# Patient Record
Sex: FEMALE | Race: WHITE | Employment: OTHER | URBAN - METROPOLITAN AREA
[De-identification: names, ages, dates, MRNs, and addresses within clinical notes are randomized per-mention and may not be internally consistent; named-entity substitution may affect disease eponyms.]

---

## 2017-05-25 ENCOUNTER — OFFICE VISIT (OUTPATIENT)
Dept: FAMILY MEDICINE | Facility: CLINIC | Age: 67
End: 2017-05-25
Payer: COMMERCIAL

## 2017-05-25 VITALS
BODY MASS INDEX: 33.08 KG/M2 | HEIGHT: 63 IN | HEART RATE: 55 BPM | TEMPERATURE: 97.2 F | DIASTOLIC BLOOD PRESSURE: 75 MMHG | SYSTOLIC BLOOD PRESSURE: 137 MMHG | WEIGHT: 186.7 LBS

## 2017-05-25 DIAGNOSIS — R30.0 DYSURIA: Primary | ICD-10-CM

## 2017-05-25 DIAGNOSIS — N30.00 ACUTE CYSTITIS WITHOUT HEMATURIA: ICD-10-CM

## 2017-05-25 LAB
ALBUMIN UR-MCNC: NEGATIVE MG/DL
APPEARANCE UR: CLEAR
BILIRUB UR QL STRIP: NEGATIVE
COLOR UR AUTO: YELLOW
GLUCOSE UR STRIP-MCNC: NEGATIVE MG/DL
HGB UR QL STRIP: NEGATIVE
KETONES UR STRIP-MCNC: NEGATIVE MG/DL
LEUKOCYTE ESTERASE UR QL STRIP: ABNORMAL
NITRATE UR QL: NEGATIVE
NON-SQ EPI CELLS #/AREA URNS LPF: ABNORMAL /LPF
PH UR STRIP: 6 PH (ref 5–7)
RBC #/AREA URNS AUTO: ABNORMAL /HPF (ref 0–2)
SP GR UR STRIP: 1.01 (ref 1–1.03)
URN SPEC COLLECT METH UR: ABNORMAL
UROBILINOGEN UR STRIP-ACNC: 0.2 EU/DL (ref 0.2–1)
WBC #/AREA URNS AUTO: ABNORMAL /HPF (ref 0–2)

## 2017-05-25 PROCEDURE — 81001 URINALYSIS AUTO W/SCOPE: CPT | Performed by: NURSE PRACTITIONER

## 2017-05-25 PROCEDURE — 99213 OFFICE O/P EST LOW 20 MIN: CPT | Performed by: NURSE PRACTITIONER

## 2017-05-25 RX ORDER — AMOXICILLIN 500 MG/1
500 CAPSULE ORAL 3 TIMES DAILY
Qty: 21 CAPSULE | Refills: 0 | Status: SHIPPED | OUTPATIENT
Start: 2017-05-25 | End: 2017-06-01

## 2017-05-25 NOTE — MR AVS SNAPSHOT
"              After Visit Summary   5/25/2017    Precious Reyes    MRN: [id number]           Patient Information     Date Of Birth          1950        Visit Information        Provider Department      5/25/2017 10:00 AM Anna Snow APRN CNP [facility]        Today's Diagnoses     Dysuria    -  1    Acute cystitis without hematuria          Care Instructions    Drink enough fluids  Amoxicillin 1 tablet 3 times daily for 1 week    For back pain try heat, Aspercreme patch, or cream  Tylenol 500 mg 4 times daily as needed           Follow-ups after your visit        Who to contact     If you have questions or need follow up information about today's clinic visit or your schedule please contact [facility] directly at [phone].  Normal or non-critical lab and imaging results will be communicated to you by PolySuitehart, letter or phone within 4 business days after the clinic has received the results. If you do not hear from us within 7 days, please contact the clinic through PolySuitehart or phone. If you have a critical or abnormal lab result, we will notify you by phone as soon as possible.  Submit refill requests through Whimseybox or call your pharmacy and they will forward the refill request to us. Please allow 3 business days for your refill to be completed.          Additional Information About Your Visit        MyChart Information     Whimseybox lets you send messages to your doctor, view your test results, renew your prescriptions, schedule appointments and more. To sign up, go to [email or web address] . Click on \"Log in\" on the left side of the screen, which will take you to the Welcome page. Then click on \"Sign up Now\" on the right side of the page.     You will be asked to enter the access code listed below, as well as some personal information. Please follow the directions to create your username and password.     Your access code is: [email or web address]  Expires: 8/23/2017 10:43 AM   " "  Your access code will  in 90 days. If you need help or a new code, please call your [facility] clinic or [phone].        Care EveryWhere ID     This is your Care EveryWhere ID. This could be used by other organizations to access your [facility] medical records  [id number]        Your Vitals Were     Pulse Temperature Height BMI (Body Mass Index)          55 97.2  F (36.2  C) (Tympanic) 5' 2.5\" (1.588 m) 33.6 kg/m2         Blood Pressure from Last 3 Encounters:   17 137/75   10/12/16 130/64   16 130/76    Weight from Last 3 Encounters:   17 186 lb 11.2 oz (84.7 kg)   10/12/16 185 lb (83.9 kg)   16 188 lb (85.3 kg)              We Performed the Following     *UA reflex to Microscopic and Culture ([place] and [facility] (except [place] and [place])     Urine Microscopic          Today's Medication Changes          These changes are accurate as of: 17 10:43 AM.  If you have any questions, ask your nurse or doctor.               Start taking these medicines.        Dose/Directions    amoxicillin 500 MG capsule   Commonly known as:  AMOXIL   Used for:  Acute cystitis without hematuria   Started by:  Anna Snow APRN CNP        Dose:  500 mg   Take 1 capsule (500 mg) by mouth 3 times daily for 7 days   Quantity:  21 capsule   Refills:  0            Where to get your medicines      These medications were sent to [facility] [address] [place] [address]     Phone:  [phone]     amoxicillin 500 MG capsule                Primary Care Provider Office Phone # Fax #    Chau Cox [phone]       [facility] [address] [facility] [address]        Thank you!     Thank you for choosing [facility]  for your care. Our goal is always to provide you with excellent care. Hearing back from our patients is one way we can continue to improve our services. Please " take a few minutes to complete the written survey that you may receive in the mail after your visit with us. Thank you!             Your Updated Medication List - Protect others around you: Learn how to safely use, store and throw away your medicines at www.disposemymeds.org.          This list is accurate as of: 5/25/17 10:43 AM.  Always use your most recent med list.                   Brand Name Dispense Instructions for use    amoxicillin 500 MG capsule    AMOXIL    21 capsule    Take 1 capsule (500 mg) by mouth 3 times daily for 7 days       aspirin 81 MG tablet     100    ONE DAILY       atenolol 50 MG tablet    TENORMIN    10 tablet    Take 1.5 tablets (75 mg) by mouth daily       CENTRUM SILVER per tablet      Take 1 tablet by mouth daily       hydrochlorothiazide 25 MG tablet    HYDRODIURIL    90 tablet    Take 1 tablet (25 mg) by mouth daily       ibuprofen 200 MG tablet    ADVIL/MOTRIN     TAKE 1 TO 2 TABLETS EVERY 4 TO 6 HOURS AS NEEDED WITH FOOD       loratadine 10 MG tablet    CLARITIN     Take 10 mg by mouth daily       order for DME     1    needs have cpap machine and mask, last one 10 years old, not fitting right       tolterodine 4 MG 24 hr capsule    DETROL LA    90 capsule    Take 1 capsule (4 mg) by mouth daily       VITAMIN D PO      Take 1 capsule by mouth.

## 2017-05-25 NOTE — PROGRESS NOTES
"  SUBJECTIVE:                                                    Precious Reyes is a 66 year old female who presents to clinic today for the following health issues: bilateral flank and lower back pain, urinary frequency, cloudy urine. Symptoms started 3 days ago.     URINARY TRACT SYMPTOMS     Onset: 3 days     Description:   Painful urination (Dysuria): no   Blood in urine (Hematuria): no   Delay in urine (Hesitency): YES    Intensity: moderate    Progression of Symptoms:  same    Accompanying Signs & Symptoms:  Fever/chills: no   Flank pain YES  Nausea and vomiting: no   Any vaginal symptoms: vaginal odor, urine was cloudy   Abdominal/Pelvic Pain: YES abdominal pain    History:   History of frequent UTI's: no   History of kidney stones: no   Sexually Active: YES  Possibility of pregnancy: No    Precipitating factors:   None        Therapies Tried and outcome: Azogen tablets     Problem list and histories reviewed & adjusted, as indicated.  Additional history: as documented    Labs reviewed in EPIC    Reviewed and updated as needed this visit by clinical staff  Tobacco  Allergies  Med Hx  Surg Hx  Fam Hx  Soc Hx      Reviewed and updated as needed this visit by Provider         ROS:  Constitutional, HEENT, cardiovascular, pulmonary, gi and gu systems are negative, except as otherwise noted.    OBJECTIVE:                                                    /75  Pulse 55  Temp 97.2  F (36.2  C) (Tympanic)  Ht 5' 2.5\" (1.588 m)  Wt 186 lb 11.2 oz (84.7 kg)  BMI 33.6 kg/m2  Body mass index is 33.6 kg/(m^2).  GENERAL: healthy, alert and no distress  ABDOMEN: tenderness suprapubic and right and left LQ mild tenderness, no organomegaly or masses, no rebound tenderness  MS: no gross musculoskeletal defects noted, no edema  BACK: no CVA tenderness, no paralumbar tenderness    Diagnostic Test Results:  Urinalysis - positive for mild UTI     ASSESSMENT/PLAN:                                                  "     1. Dysuria    - UA reflex to Microscopic and Culture ([place] [facility] (except [place] and [place])  - Urine Microscopic-positive for mild UTI    2. Acute cystitis without hematuria  - amoxicillin (AMOXIL) 500 MG capsule; Take 1 capsule (500 mg) by mouth 3 times daily for 7 days  Dispense: 21 capsule; Refill: 0  -Tylenol 500 mg 4 times daily as needed for back pain     See Patient Instructions    LIANA Burns [facility]

## 2017-05-25 NOTE — NURSING NOTE
"Chief Complaint   Patient presents with     Flank Pain     3 days        Initial /75  Pulse 55  Temp 97.2  F (36.2  C) (Tympanic)  Ht 5' 2.5\" (1.588 m)  Wt 186 lb 11.2 oz (84.7 kg)  BMI 33.6 kg/m2 Estimated body mass index is 33.6 kg/(m^2) as calculated from the following:    Height as of this encounter: 5' 2.5\" (1.588 m).    Weight as of this encounter: 186 lb 11.2 oz (84.7 kg).  Medication Reconciliation: complete  "

## 2017-05-25 NOTE — PATIENT INSTRUCTIONS
Drink enough fluids  Amoxicillin 1 tablet 3 times daily for 1 week    For back pain try heat, Aspercreme patch, or cream  Tylenol 500 mg 4 times daily as needed

## 2017-09-08 ENCOUNTER — OFFICE VISIT (OUTPATIENT)
Dept: URGENT CARE | Facility: URGENT CARE | Age: 67
End: 2017-09-08
Payer: COMMERCIAL

## 2017-09-08 VITALS
WEIGHT: 185.8 LBS | HEART RATE: 53 BPM | SYSTOLIC BLOOD PRESSURE: 135 MMHG | BODY MASS INDEX: 33.44 KG/M2 | TEMPERATURE: 97.8 F | DIASTOLIC BLOOD PRESSURE: 72 MMHG | RESPIRATION RATE: 20 BRPM

## 2017-09-08 DIAGNOSIS — H11.32 SUBCONJUNCTIVAL HEMORRHAGE, LEFT: Primary | ICD-10-CM

## 2017-09-08 PROCEDURE — 99213 OFFICE O/P EST LOW 20 MIN: CPT

## 2017-09-08 NOTE — MR AVS SNAPSHOT
After Visit Summary   9/8/2017    Precious Reyes    MRN: [id number]           Patient Information     Date Of Birth          1950        Visit Information        Provider Department      9/8/2017 5:05 PM [facility] SAME DAY PROVIDER [facility]        Today's Diagnoses     Subconjunctival hemorrhage, left    -  1      Care Instructions    This is a ruptured blood vessel in your eye, probably from your sneezing yesterday. This will absorb over time.    If you would have any pain or any change in vision make sure you go to the ER or your eye doctor.      Indications for emergent return to emergency department discussed with patient, who verbalized good understanding and agreement.  Patient understands the limitations of today's evaluation.         Subconjunctival Hemorrhage    A subconjunctival hemorrhage is when a blood vessel breaks open in the white of the eye. It causes a bright red patch in the white of the eye. It is similar to a bruise on the skin. This type of hemorrhage is common. It can look quite alarming, but it is usually harmless.  Understanding the conjunctiva  The conjunctiva is the thin layer that covers the inside of the eyelids and the surface of the eye. It has many tiny blood vessels that bring oxygen and nutrients to the eye. The sclera is the white part of the eye that lies beneath the conjunctiva. Sometimes a blood vessel in the conjunctiva breaks open and bleeds. The blood then collects under the conjunctiva and turns part of the eye red. Over several weeks, your body then absorbs the blood.  What causes subconjunctival hemorrhage?  In many cases the cause isn t known. But some health conditions may make it more likely. These include:    Eye injury    Eye surgery    High blood pressure    Inflammation of the conjunctiva    Contact lens use    Diabetes    Arteriosclerosis    Tumor of the conjunctiva    Diseases that affect blood clotting    Violent  sneezing, coughing, or vomiting    Certain medicines that can increase bleeding, such as aspirin    Pushing hard during childbirth    Straining during constipation  Symptoms of subconjunctival hemorrhage  The main symptom is a red patch on the eye. You may notice it after waking up in the morning. In most cases just one eye will have a hemorrhage. It usually happens once and then goes away. But some health conditions may cause repeat hemorrhages. You may feel like you have something in your eye, but this is not common. The hemorrhage shouldn t affect your eyesight or cause any pain. If you do have pain, you may have another type of problem with your eye.  Diagnosing subconjunctival hemorrhage  Your healthcare provider will ask about your health history. You may have a physical exam. This includes a basic eye exam. Your provider will make sure you don t have other causes of red eye that may need other treatment.  You will need to see an eye doctor (ophthalmologist) if you have had an eye injury. This doctor might use a special lighted microscope to look closely at your eye. This helps show the doctor if the injury hurt the eye itself and not just its outer layer.  If this is not your first subconjunctival hemorrhage, your doctor may need to find the cause. For example, you may need blood tests to check for a blood clotting disorder.  Treatment for subconjunctival hemorrhage  In most cases you will not need treatment. The red patch will usually go away on its own in a few weeks. It will turn from red to brown and then yellow. There are no treatments to speed up this process. Your doctor may suggest you use a warm compress and artificial tears eye drops to help relieve some of the redness.  If your subconjunctival hemorrhage was caused by a health condition, that condition will be treated. For example, you may need a blood pressure medicine to treat high blood pressure.  When to call your healthcare provider  Call your  healthcare provider right away if you have any of these:    Hemorrhage that doesn t go away in 2 to 3 weeks    Eye pain    Loss of eyesight    Another subconjunctival hemorrhage    Date Last Reviewed: 2/1/2017 2000-2017 [employer]. [address] [place] [address]. All rights reserved. This information is not intended as a substitute for professional medical care. Always follow your healthcare professional's instructions.        Subconjunctival Hemorrhage    A subconjunctival hemorrhage is a result of a broken blood vessel in the white portion of the eye. It is usually painless and may be caused by coughing, sneezing, or vomiting. An injury to the eye can cause this. It can also be a sign of hypertension (high blood pressure) or a bleeding disorder.  Although it can look frightening, the presence of the blood is not serious. The blood will be reabsorbed without treatment within 2 to 3 weeks.  Home care  You may continue your usual activities.  Follow-up care  Follow up with your healthcare provider, or as advised.  When to seek medical advice  Contact your healthcare provider right away if any of these occur:    Pain in the eye    Change in vision    The blood does not disappear within three weeks    Increasing redness or swelling of the eye    Severe headache or dizziness    Signs of bruising or bleeding from other parts of your body  Date Last Reviewed: 6/14/2015 2000-2017 [employer]. [address] [place] [address]. All rights reserved. This information is not intended as a substitute for professional medical care. Always follow your healthcare professional's instructions.                Follow-ups after your visit        Follow-up notes from your care team     See patient instructions section of the AVS Return if symptoms worsen or fail to improve.      Who to contact     If you have questions or need follow up information about today's clinic visit or your  "schedule please contact [facility] directly at [phone].  Normal or non-critical lab and imaging results will be communicated to you by MyChart, letter or phone within 4 business days after the clinic has received the results. If you do not hear from us within 7 days, please contact the clinic through Compositencehart or phone. If you have a critical or abnormal lab result, we will notify you by phone as soon as possible.  Submit refill requests through ki work or call your pharmacy and they will forward the refill request to us. Please allow 3 business days for your refill to be completed.          Additional Information About Your Visit        Compositenceharhovelstay Information     ki work lets you send messages to your doctor, view your test results, renew your prescriptions, schedule appointments and more. To sign up, go to [email or web address] . Click on \"Log in\" on the left side of the screen, which will take you to the Welcome page. Then click on \"Sign up Now\" on the right side of the page.     You will be asked to enter the access code listed below, as well as some personal information. Please follow the directions to create your username and password.     Your access code is: [email or web address]  Expires: 2017  5:32 PM     Your access code will  in 90 days. If you need help or a new code, please call your [facility] clinic or [phone].        Care EveryWhere ID     This is your Care EveryWhere ID. This could be used by other organizations to access your [facility] medical records  [id number]        Your Vitals Were     Pulse Temperature Respirations BMI (Body Mass Index)          53 97.8  F (36.6  C) (Tympanic) 20 33.44 kg/m2         Blood Pressure from Last 3 Encounters:   17 135/72   17 137/75   10/12/16 130/64    Weight from Last 3 Encounters:   17 185 lb 12.8 oz (84.3 kg)   17 186 lb 11.2 oz (84.7 kg)   10/12/16 185 lb (83.9 kg)              Today, you had " the following     No orders found for display       Primary Care Provider Office Phone # Fax #    Chau Cox [phone] [address] [facility] [address]        Equal Access to Services     ISAAC LIM : Hadii aad ku hadluis angelo Sodeionali, waaxda luqadaha, qaybta kaalmada adeegyada, raymond marie antoinette jaramillo. So [facility] [phone].    ATENCIÓN: Si habla español, tiene a farooq disposición servicios gratuitos de asistencia lingüística. Llame al [phone].    We comply with applicable federal civil rights laws and [place] laws. We do not discriminate on the basis of race, color, national origin, age, disability sex, sexual orientation or gender identity.            Thank you!     Thank you for choosing [facility]  for your care. Our goal is always to provide you with excellent care. Hearing back from our patients is one way we can continue to improve our services. Please take a few minutes to complete the written survey that you may receive in the mail after your visit with us. Thank you!             Your Updated Medication List - Protect others around you: Learn how to safely use, store and throw away your medicines at www.disposemymeds.org.          This list is accurate as of: 9/8/17  5:32 PM.  Always use your most recent med list.                   Brand Name Dispense Instructions for use Diagnosis    aspirin 81 MG tablet     100    ONE DAILY        atenolol 50 MG tablet    TENORMIN    10 tablet    Take 1.5 tablets (75 mg) by mouth daily    Essential hypertension with goal blood pressure less than 140/90       CENTRUM SILVER per tablet      Take 1 tablet by mouth daily        hydrochlorothiazide 25 MG tablet    HYDRODIURIL    90 tablet    Take 1 tablet (25 mg) by mouth daily    Essential hypertension with goal blood pressure less than 140/90       ibuprofen 200 MG tablet    ADVIL/MOTRIN     TAKE 1 TO 2 TABLETS EVERY 4 TO 6 HOURS AS  NEEDED WITH FOOD        loratadine 10 MG tablet    CLARITIN     Take 10 mg by mouth daily        order for DME     1    needs have cpap machine and mask, last one 10 years old, not fitting right    LOLY (obstructive sleep apnea)       tolterodine 4 MG 24 hr capsule    DETROL LA    90 capsule    Take 1 capsule (4 mg) by mouth daily    Urgency of urination, Urge incontinence       VITAMIN D PO      Take 1 capsule by mouth.

## 2017-09-08 NOTE — PATIENT INSTRUCTIONS
This is a ruptured blood vessel in your eye, probably from your sneezing yesterday. This will absorb over time.    If you would have any pain or any change in vision make sure you go to the ER or your eye doctor.      Indications for emergent return to emergency department discussed with patient, who verbalized good understanding and agreement.  Patient understands the limitations of today's evaluation.         Subconjunctival Hemorrhage    A subconjunctival hemorrhage is when a blood vessel breaks open in the white of the eye. It causes a bright red patch in the white of the eye. It is similar to a bruise on the skin. This type of hemorrhage is common. It can look quite alarming, but it is usually harmless.  Understanding the conjunctiva  The conjunctiva is the thin layer that covers the inside of the eyelids and the surface of the eye. It has many tiny blood vessels that bring oxygen and nutrients to the eye. The sclera is the white part of the eye that lies beneath the conjunctiva. Sometimes a blood vessel in the conjunctiva breaks open and bleeds. The blood then collects under the conjunctiva and turns part of the eye red. Over several weeks, your body then absorbs the blood.  What causes subconjunctival hemorrhage?  In many cases the cause isn t known. But some health conditions may make it more likely. These include:    Eye injury    Eye surgery    High blood pressure    Inflammation of the conjunctiva    Contact lens use    Diabetes    Arteriosclerosis    Tumor of the conjunctiva    Diseases that affect blood clotting    Violent sneezing, coughing, or vomiting    Certain medicines that can increase bleeding, such as aspirin    Pushing hard during childbirth    Straining during constipation  Symptoms of subconjunctival hemorrhage  The main symptom is a red patch on the eye. You may notice it after waking up in the morning. In most cases just one eye will have a hemorrhage. It usually happens once and then goes  away. But some health conditions may cause repeat hemorrhages. You may feel like you have something in your eye, but this is not common. The hemorrhage shouldn t affect your eyesight or cause any pain. If you do have pain, you may have another type of problem with your eye.  Diagnosing subconjunctival hemorrhage  Your healthcare provider will ask about your health history. You may have a physical exam. This includes a basic eye exam. Your provider will make sure you don t have other causes of red eye that may need other treatment.  You will need to see an eye doctor (ophthalmologist) if you have had an eye injury. This doctor might use a special lighted microscope to look closely at your eye. This helps show the doctor if the injury hurt the eye itself and not just its outer layer.  If this is not your first subconjunctival hemorrhage, your doctor may need to find the cause. For example, you may need blood tests to check for a blood clotting disorder.  Treatment for subconjunctival hemorrhage  In most cases you will not need treatment. The red patch will usually go away on its own in a few weeks. It will turn from red to brown and then yellow. There are no treatments to speed up this process. Your doctor may suggest you use a warm compress and artificial tears eye drops to help relieve some of the redness.  If your subconjunctival hemorrhage was caused by a health condition, that condition will be treated. For example, you may need a blood pressure medicine to treat high blood pressure.  When to call your healthcare provider  Call your healthcare provider right away if you have any of these:    Hemorrhage that doesn t go away in 2 to 3 weeks    Eye pain    Loss of eyesight    Another subconjunctival hemorrhage    Date Last Reviewed: 2/1/2017 2000-2017 [employer]. [address] [place] [address], [place] [address]. All rights reserved. This information is not intended as a substitute for professional medical  care. Always follow your healthcare professional's instructions.        Subconjunctival Hemorrhage    A subconjunctival hemorrhage is a result of a broken blood vessel in the white portion of the eye. It is usually painless and may be caused by coughing, sneezing, or vomiting. An injury to the eye can cause this. It can also be a sign of hypertension (high blood pressure) or a bleeding disorder.  Although it can look frightening, the presence of the blood is not serious. The blood will be reabsorbed without treatment within 2 to 3 weeks.  Home care  You may continue your usual activities.  Follow-up care  Follow up with your healthcare provider, or as advised.  When to seek medical advice  Contact your healthcare provider right away if any of these occur:    Pain in the eye    Change in vision    The blood does not disappear within three weeks    Increasing redness or swelling of the eye    Severe headache or dizziness    Signs of bruising or bleeding from other parts of your body  Date Last Reviewed: 6/14/2015 2000-2017 [employer]. [address] [place] [address], [place] [address]. All rights reserved. This information is not intended as a substitute for professional medical care. Always follow your healthcare professional's instructions.

## 2017-09-08 NOTE — PROGRESS NOTES
SUBJECTIVE:   Precious Reyes is a 67 year old female who presents to clinic today for the following health issues:    Eye(s) Problem  Onset: Yesterday morning     Description:   Location: left  Pain: no  Redness: YES    Accompanying Signs & Symptoms:  Discharge/mattering: no  Swelling: YES- slight   Visual changes: no  Fever: no  Nasal Congestion: no  Bothered by bright lights: YES- little bit     History:   Trauma: no   Foreign body exposure: no     Precipitating factors:   Wearing contacts: no    Alleviating factors:  Improved by: Na     Therapies Tried and outcome: Yesterday tried allergy visine- burned a little,  Ibuprofen today           Problem list and histories reviewed & adjusted, as indicated.  Additional history: as documented    Patient Active Problem List   Diagnosis     Other malignant neoplasm of skin, site unspecified     Essential hypertension     Urinary incontinence     Pain in joint, forearm     wrist strain bilateral     Ingrowing nail     Actinic keratosis     RIGHT KNEE PAIN AND LUMP     RECTAL PAIN     Disorder of bone and cartilage     Disease of jaw     Other chest pain     Abnormal weight gain     CARDIOVASCULAR SCREENING; LDL GOAL LESS THAN 130     Advanced directives, counseling/discussion     OA (osteoarthritis) of finger, unspecified laterality     Hypertriglyceridemia     Past Surgical History:   Procedure Laterality Date     BIOPSY BREAST       COLONOSCOPY  age 49    repeat 10 years, done at outside hospital     HC EXCISION BREAST LESION, OPEN >=1      5 biopsies all neg     HC REMOVAL GALLBLADDER  9 05    Cholecystectomy     HYSTERECTOMY, PAP NO LONGER INDICATED  2004    Hysterectomy, [facility] [place]       Social History   Substance Use Topics     Smoking status: Never Smoker     Smokeless tobacco: Never Used     Alcohol use Yes      Comment: 0-2 drinks per week     Family History   Problem Relation Age of Onset     HEART DISEASE Mother       78     Alcohol/Drug Mother       alcholism     Obesity Mother      HEART DISEASE Father      Allergies Brother      Arthritis Brother      Allergies Sister      Thyroid Disease Sister      Arthritis Sister      Lupus     Neurologic Disorder Sister      Bipolar     DIABETES Son      Allergies Sister      Thyroid Disease Sister      Thyroid Disease Sister      Thyroid Disease Sister      Cancer - colorectal No family hx of      Breast Cancer No family hx of      Endometrial Cancer No family hx of      Colon Cancer No family hx of      Ovarian Cancer No family hx of          Current Outpatient Prescriptions   Medication Sig Dispense Refill     atenolol (TENORMIN) 50 MG tablet Take 1.5 tablets (75 mg) by mouth daily 10 tablet 0     loratadine (CLARITIN) 10 MG tablet Take 10 mg by mouth daily       hydrochlorothiazide (HYDRODIURIL) 25 MG tablet Take 1 tablet (25 mg) by mouth daily 90 tablet 3     tolterodine (DETROL LA) 4 MG 24 hr capsule Take 1 capsule (4 mg) by mouth daily 90 capsule 3     Multiple Vitamins-Minerals (CENTRUM SILVER) per tablet Take 1 tablet by mouth daily       VITAMIN D PO Take 1 capsule by mouth.       IBUPROFEN 200 MG OR TABS TAKE 1 TO 2 TABLETS EVERY 4 TO 6 HOURS AS NEEDED WITH FOOD  0     ORDER FOR DME needs have cpap machine and mask, last one 10 years old, not fitting right 1 0     ASPIRIN 81 MG OR TABS ONE DAILY 100 3     Allergies   Allergen Reactions     Adhesive Tape Rash     Foam     Latex Rash     Sulfa Drugs Rash     Labs reviewed in EPIC      Reviewed and updated as needed this visit by clinical staffTobacco  Allergies  Meds  Problems  Med Hx  Surg Hx  Fam Hx  Soc Hx        Reviewed and updated as needed this visit by Provider  Allergies  Meds  Problems         ROS:  Constitutional, HEENT, cardiovascular, pulmonary, GI, , musculoskeletal, neuro, skin, endocrine and psych systems are negative, except as otherwise noted.      OBJECTIVE:   /72 (BP Location: Right arm, Cuff Size: Adult Regular)  Pulse  53  Temp 97.8  F (36.6  C) (Tympanic)  Resp 20  Wt 185 lb 12.8 oz (84.3 kg)  BMI 33.44 kg/m2  Body mass index is 33.44 kg/(m^2).   GENERAL: healthy, alert and no distress, nontoxic in appearance  EYES: Eyes grossly normal to inspection with exception of a subconjunctival hemorrhage on lateral and mid lower left eye, vision intact and no pain, PERRL bilaterally and conjunctivae normal bilaterally and sclerae normal  on right  HENT:normocephalic  NECK: supple with full ROM  ABDOMEN: soft, nontender  MS: no gross musculoskeletal defects noted, no edema    Diagnostic Test Results:  No results found for this or any previous visit (from the past 24 hour(s)).    ASSESSMENT/PLAN:     Problem List Items Addressed This Visit     None      Visit Diagnoses     Subconjunctival hemorrhage, left    -  Primary               Patient Instructions     This is a ruptured blood vessel in your eye, probably from your sneezing yesterday. This will absorb over time.    If you would have any pain or any change in vision make sure you go to the ER or your eye doctor.      Indications for emergent return to emergency department discussed with patient, who verbalized good understanding and agreement.  Patient understands the limitations of today's evaluation.         Subconjunctival Hemorrhage    A subconjunctival hemorrhage is when a blood vessel breaks open in the white of the eye. It causes a bright red patch in the white of the eye. It is similar to a bruise on the skin. This type of hemorrhage is common. It can look quite alarming, but it is usually harmless.  Understanding the conjunctiva  The conjunctiva is the thin layer that covers the inside of the eyelids and the surface of the eye. It has many tiny blood vessels that bring oxygen and nutrients to the eye. The sclera is the white part of the eye that lies beneath the conjunctiva. Sometimes a blood vessel in the conjunctiva breaks open and bleeds. The blood then collects under  the conjunctiva and turns part of the eye red. Over several weeks, your body then absorbs the blood.  What causes subconjunctival hemorrhage?  In many cases the cause isn t known. But some health conditions may make it more likely. These include:    Eye injury    Eye surgery    High blood pressure    Inflammation of the conjunctiva    Contact lens use    Diabetes    Arteriosclerosis    Tumor of the conjunctiva    Diseases that affect blood clotting    Violent sneezing, coughing, or vomiting    Certain medicines that can increase bleeding, such as aspirin    Pushing hard during childbirth    Straining during constipation  Symptoms of subconjunctival hemorrhage  The main symptom is a red patch on the eye. You may notice it after waking up in the morning. In most cases just one eye will have a hemorrhage. It usually happens once and then goes away. But some health conditions may cause repeat hemorrhages. You may feel like you have something in your eye, but this is not common. The hemorrhage shouldn t affect your eyesight or cause any pain. If you do have pain, you may have another type of problem with your eye.  Diagnosing subconjunctival hemorrhage  Your healthcare provider will ask about your health history. You may have a physical exam. This includes a basic eye exam. Your provider will make sure you don t have other causes of red eye that may need other treatment.  You will need to see an eye doctor (ophthalmologist) if you have had an eye injury. This doctor might use a special lighted microscope to look closely at your eye. This helps show the doctor if the injury hurt the eye itself and not just its outer layer.  If this is not your first subconjunctival hemorrhage, your doctor may need to find the cause. For example, you may need blood tests to check for a blood clotting disorder.  Treatment for subconjunctival hemorrhage  In most cases you will not need treatment. The red patch will usually go away on its own  in a few weeks. It will turn from red to brown and then yellow. There are no treatments to speed up this process. Your doctor may suggest you use a warm compress and artificial tears eye drops to help relieve some of the redness.  If your subconjunctival hemorrhage was caused by a health condition, that condition will be treated. For example, you may need a blood pressure medicine to treat high blood pressure.  When to call your healthcare provider  Call your healthcare provider right away if you have any of these:    Hemorrhage that doesn t go away in 2 to 3 weeks    Eye pain    Loss of eyesight    Another subconjunctival hemorrhage    Date Last Reviewed: 2/1/2017 2000-2017 [employer]. [address] [place] [address], [place] [address]. All rights reserved. This information is not intended as a substitute for professional medical care. Always follow your healthcare professional's instructions.        Subconjunctival Hemorrhage    A subconjunctival hemorrhage is a result of a broken blood vessel in the white portion of the eye. It is usually painless and may be caused by coughing, sneezing, or vomiting. An injury to the eye can cause this. It can also be a sign of hypertension (high blood pressure) or a bleeding disorder.  Although it can look frightening, the presence of the blood is not serious. The blood will be reabsorbed without treatment within 2 to 3 weeks.  Home care  You may continue your usual activities.  Follow-up care  Follow up with your healthcare provider, or as advised.  When to seek medical advice  Contact your healthcare provider right away if any of these occur:    Pain in the eye    Change in vision    The blood does not disappear within three weeks    Increasing redness or swelling of the eye    Severe headache or dizziness    Signs of bruising or bleeding from other parts of your body  Date Last Reviewed: 6/14/2015 2000-2017 [employer]. [address] [place] [address],  [place] [address]. All rights reserved. This information is not intended as a substitute for professional medical care. Always follow your healthcare professional's instructions.          FABIENNE Spaulding    FLNB SAME DAY PROVIDER  [facility]

## 2017-09-08 NOTE — NURSING NOTE
"Chief Complaint   Patient presents with     Eye Problem     lower side of eye        Initial /72 (BP Location: Right arm, Cuff Size: Adult Regular)  Pulse 53  Temp 97.8  F (36.6  C) (Tympanic)  Resp 20  Wt 185 lb 12.8 oz (84.3 kg)  BMI 33.44 kg/m2 Estimated body mass index is 33.44 kg/(m^2) as calculated from the following:    Height as of 5/25/17: 5' 2.5\" (1.588 m).    Weight as of this encounter: 185 lb 12.8 oz (84.3 kg).  Medication Reconciliation: complete    Health Maintenance that is potentially due pending provider review:  NONE    n/a    Is there anyone who you would like to be able to receive your results? Not Applicable  If yes have patient fill out ANDERS  Beena Hernandez M.A.        "

## 2017-09-12 ENCOUNTER — TELEPHONE (OUTPATIENT)
Dept: FAMILY MEDICINE | Facility: CLINIC | Age: 67
End: 2017-09-12

## 2017-09-12 ENCOUNTER — HOSPITAL ENCOUNTER (EMERGENCY)
Facility: CLINIC | Age: 67
Discharge: HOME | End: 2017-09-12
Attending: EMERGENCY MEDICINE | Admitting: EMERGENCY MEDICINE
Payer: MEDICARE

## 2017-09-12 VITALS
DIASTOLIC BLOOD PRESSURE: 78 MMHG | TEMPERATURE: 98.1 F | RESPIRATION RATE: 18 BRPM | SYSTOLIC BLOOD PRESSURE: 157 MMHG | OXYGEN SATURATION: 96 %

## 2017-09-12 DIAGNOSIS — H11.32 SUBCONJUNCTIVAL HEMORRHAGE OF LEFT EYE: ICD-10-CM

## 2017-09-12 PROCEDURE — 99283 EMERGENCY DEPT VISIT LOW MDM: CPT | Performed by: EMERGENCY MEDICINE

## 2017-09-12 PROCEDURE — 99282 EMERGENCY DEPT VISIT SF MDM: CPT | Mod: Z6 | Performed by: EMERGENCY MEDICINE

## 2017-09-12 RX ORDER — CETIRIZINE HYDROCHLORIDE 10 MG/1
10 TABLET ORAL DAILY
COMMUNITY

## 2017-09-12 ASSESSMENT — ENCOUNTER SYMPTOMS
NAUSEA: 0
COUGH: 0
VOMITING: 0
FEVER: 0
EYE PAIN: 1
EYE REDNESS: 1

## 2017-09-12 NOTE — TELEPHONE ENCOUNTER
"Patient reporting that eye is more reddened.  Now also having pain behind her eye that is new.  Patient took ibuprofen for this and it has not improved.  She is unsure if her vision is different, but reports that her eye feels \"heavy\".  Patient will return to /ER in [place] for evaluation.    Ruby Leo RN    "

## 2017-09-12 NOTE — DISCHARGE INSTRUCTIONS
Return if symptoms worsen or new symptoms develop.  Follow-up with [facility] upon discharge.  If need further assessment please return for further assessment and care.  Subconjunctival Hemorrhage    A subconjunctival hemorrhage is when a blood vessel breaks open in the white of the eye. It causes a bright red patch in the white of the eye. It is similar to a bruise on the skin. This type of hemorrhage is common. It can look quite alarming, but it is usually harmless.  Understanding the conjunctiva  The conjunctiva is the thin layer that covers the inside of the eyelids and the surface of the eye. It has many tiny blood vessels that bring oxygen and nutrients to the eye. The sclera is the white part of the eye that lies beneath the conjunctiva. Sometimes a blood vessel in the conjunctiva breaks open and bleeds. The blood then collects under the conjunctiva and turns part of the eye red. Over several weeks, your body then absorbs the blood.  What causes subconjunctival hemorrhage?  In many cases the cause isn t known. But some health conditions may make it more likely. These include:    Eye injury    Eye surgery    High blood pressure    Inflammation of the conjunctiva    Contact lens use    Diabetes    Arteriosclerosis    Tumor of the conjunctiva    Diseases that affect blood clotting    Violent sneezing, coughing, or vomiting    Certain medicines that can increase bleeding, such as aspirin    Pushing hard during childbirth    Straining during constipation  Symptoms of subconjunctival hemorrhage  The main symptom is a red patch on the eye. You may notice it after waking up in the morning. In most cases just one eye will have a hemorrhage. It usually happens once and then goes away. But some health conditions may cause repeat hemorrhages. You may feel like you have something in your eye, but this is not common. The hemorrhage shouldn t affect your eyesight or cause any pain. If you do have pain, you may have  another type of problem with your eye.  Diagnosing subconjunctival hemorrhage  Your healthcare provider will ask about your health history. You may have a physical exam. This includes a basic eye exam. Your provider will make sure you don t have other causes of red eye that may need other treatment.  You will need to see an eye doctor (ophthalmologist) if you have had an eye injury. This doctor might use a special lighted microscope to look closely at your eye. This helps show the doctor if the injury hurt the eye itself and not just its outer layer.  If this is not your first subconjunctival hemorrhage, your doctor may need to find the cause. For example, you may need blood tests to check for a blood clotting disorder.  Treatment for subconjunctival hemorrhage  In most cases you will not need treatment. The red patch will usually go away on its own in a few weeks. It will turn from red to brown and then yellow. There are no treatments to speed up this process. Your doctor may suggest you use a warm compress and artificial tears eye drops to help relieve some of the redness.  If your subconjunctival hemorrhage was caused by a health condition, that condition will be treated. For example, you may need a blood pressure medicine to treat high blood pressure.  When to call your healthcare provider  Call your healthcare provider right away if you have any of these:    Hemorrhage that doesn t go away in 2 to 3 weeks    Eye pain    Loss of eyesight    Another subconjunctival hemorrhage    Date Last Reviewed: 2/1/2017 2000-2017 [employer]. [address] [place] [address], [place] [address]. All rights reserved. This information is not intended as a substitute for professional medical care. Always follow your healthcare professional's instructions.

## 2017-09-12 NOTE — ED NOTES
Pt here with left red eye. Pt reports going to urgent care on Friday and was told she had a popped blood vessel. This morning the patient woke up with constant dull headache on the left side of the back of her head.  states that the patient sneezed really hard last week then they noticed the red eye. Pt declines any vision changes. Pt states that she feels like her eye is getting swollen, she has been using artificial tears a lot.

## 2017-09-12 NOTE — ED PROVIDER NOTES
History     Chief Complaint   Patient presents with     Eye Problem     Pt seen in  on Friday, told she has broken blood vessel in her L eye,  getting worse.  Has nagging ache behind L eye.      HPI  Precious Reyes is a 67 year old female who presents to the Emergency Department for concerns of left eye pain. Patient was seen in urgent care four days ago and diagnosed with a subconjunctival hematoma which occurred after sneezing. The patient feels the hematoma has increased in size, and she is reports a headache like sensation in her left eye. She denies visual changes. There is no recent coughing, vomiting or sneezing since the initial incident. She denies nausea, vomiting or fever. She did take ibuprofen for her headache, but is not on any other blood thinners.     Patient Active Problem List   Diagnosis     Other malignant neoplasm of skin, site unspecified     Essential hypertension     Urinary incontinence     Pain in joint, forearm     wrist strain bilateral     Ingrowing nail     Actinic keratosis     RIGHT KNEE PAIN AND LUMP     RECTAL PAIN     Disorder of bone and cartilage     Disease of jaw     Other chest pain     Abnormal weight gain     CARDIOVASCULAR SCREENING; LDL GOAL LESS THAN 130     Advanced directives, counseling/discussion     OA (osteoarthritis) of finger, unspecified laterality     Hypertriglyceridemia     Current Outpatient Prescriptions   Medication Sig Dispense Refill     atenolol (TENORMIN) 50 MG tablet Take 1.5 tablets (75 mg) by mouth daily 10 tablet 0     loratadine (CLARITIN) 10 MG tablet Take 10 mg by mouth daily       hydrochlorothiazide (HYDRODIURIL) 25 MG tablet Take 1 tablet (25 mg) by mouth daily 90 tablet 3     tolterodine (DETROL LA) 4 MG 24 hr capsule Take 1 capsule (4 mg) by mouth daily 90 capsule 3     Multiple Vitamins-Minerals (CENTRUM SILVER) per tablet Take 1 tablet by mouth daily       VITAMIN D PO Take 1 capsule by mouth.       IBUPROFEN 200 MG OR TABS TAKE 1 TO 2  TABLETS EVERY 4 TO 6 HOURS AS NEEDED WITH FOOD  0     ORDER FOR DME needs have cpap machine and mask, last one 10 years old, not fitting right 1 0     ASPIRIN 81 MG OR TABS ONE DAILY 100 3     Allergies   Allergen Reactions     Adhesive Tape Rash     Foam     Latex Rash     Sulfa Drugs Rash       I have reviewed the Medications, Allergies, Past Medical and Surgical History, and Social History in the Epic system.    Review of Systems   Constitutional: Negative for fever.   HENT: Negative for sneezing.    Eyes: Positive for pain (left- headache like sensation) and redness (subconjunctival hematoma). Negative for visual disturbance.   Respiratory: Negative for cough.    Gastrointestinal: Negative for nausea and vomiting.       Physical Exam   BP: 154/79  Heart Rate: 52  Temp: 98.1  F (36.7  C)  Resp: 16  SpO2: 97 %  Physical Exam  HEENT: Oral mucosa moist. No lesions. EOMI. Left eye with large subconjunctival hematoma covering most of sclera. PERRL. Retina without obvious abnormality. No bulging noted. Vision intact able to read fine print without difficulty.   Neck: Supple  Pulmonary/Chest: Normal effort.   Musculoskeletal: Moving all extremities well. No peripheral edema.   Neurological: Alert. No focal neurologic deficit.   Skin: No rash.    ED Course     ED Course     Procedures             Critical Care time:  none               Labs Ordered and Resulted from Time of ED Arrival Up to the Time of Departure from the ED - No data to display     No results found for this or any previous visit (from the past 24 hour(s)).    Medications - No data to display    1:55 PM Patient assessed.     Assessments & Plan (with Medical Decision Making) due to patient's worsening symptoms I discussed the case with Dr. Caceres with opthalmology. He recommended sending the patient to his clinic for further evaluation . I thought he would be sending patient back for further evaluation but [facility] was [facility] and  went home.       I have reviewed the nursing notes.    I have reviewed the findings, diagnosis, plan and need for follow up with the patient.       New Prescriptions    No medications on file       Final diagnoses:   Subconjunctival hemorrhage of left eye     This document serves as a record of the services and decisions personally performed and made by Juanpablo Rasmussen MD. It was created on his behalf by Deborah Logan, a trained medical scribe. The creation of this document is based the provider's statements to the medical scribe.  Deborah Logan 1:58 PM 9/12/2017    Provider:   The information in this document, created by the medical scribe for me, accurately reflects the services I personally performed and the decisions made by me. I have reviewed and approved this document for accuracy prior to leaving the patient care area.  Juanpablo Rasmussen MD 1:58 PM 9/12/2017 9/12/2017   [facility] EMERGENCY DEPARTMENT     Juanpablo Rasmussen MD  09/13/17 0916

## 2017-09-12 NOTE — TELEPHONE ENCOUNTER
Patient calling to report  Her eye  Is worst the whole eye is red/pain behind eye  .  Patient was seen in  on 09/08/2017  Please call and advise  Monserrat Evans- [facility]

## 2017-09-12 NOTE — ED NOTES
Report received and all questions answered. I will assume care of PT at this time.     PT is currently in Eye clinic and I will assess upon arrival back to department.

## 2017-09-14 ENCOUNTER — TELEPHONE (OUTPATIENT)
Dept: FAMILY MEDICINE | Facility: CLINIC | Age: 67
End: 2017-09-14

## 2017-09-14 DIAGNOSIS — I10 ESSENTIAL HYPERTENSION WITH GOAL BLOOD PRESSURE LESS THAN 140/90: ICD-10-CM

## 2017-09-14 RX ORDER — METOPROLOL TARTRATE 50 MG
TABLET ORAL
Qty: 45 TABLET | Refills: 1 | Status: SHIPPED | OUTPATIENT
Start: 2017-09-14 | End: 2017-10-10 | Stop reason: DRUGHIGH

## 2017-09-14 RX ORDER — ATENOLOL 50 MG/1
75 TABLET ORAL DAILY
Qty: 10 TABLET | Refills: 0 | Status: CANCELLED | OUTPATIENT
Start: 2017-09-14

## 2017-09-14 NOTE — TELEPHONE ENCOUNTER
Please notify prescription sent to pharmacy for the replacement med, Metoprolol. This is the same dose as the old Atenolol, at 75 mg daily.   Atenolol is not available. He should see Dr. Cox for follow up. Juanpablo Thomas

## 2017-09-14 NOTE — TELEPHONE ENCOUNTER
"Notified her. Also suggested she come in for free RN BP recheck in a couple of weeks on the new med. \"ok, will do, and I am due for my annual exam soon too , so will schedule that. Thanks, \"    Melina Ochoa RNC    "

## 2017-09-14 NOTE — TELEPHONE ENCOUNTER
Atenolol    Last Written Prescription Date: 11/3/2016  Last Fill Quantity: 10, # refills: 0  Last Office Visit with [facility], [facility] or [facility] prescribing provider: 5/25/17       Potassium   Date Value Ref Range Status   10/12/2016 3.7 3.4 - 5.3 mmol/L Final     Creatinine   Date Value Ref Range Status   10/12/2016 0.91 0.52 - 1.04 mg/dL Final     BP Readings from Last 3 Encounters:   09/12/17 157/78   09/08/17 135/72   05/25/17 137/75     Patient states she has only 4 days left of this medication. She uses [facility] in [place].  Celeste Burrows  [facility]

## 2017-09-14 NOTE — TELEPHONE ENCOUNTER
[place] shortage of atenolol.   Routing refill request to provider for review/approval because:  Med needs to be changed, please.   Melina Ochoa RNC

## 2017-10-10 ENCOUNTER — OFFICE VISIT (OUTPATIENT)
Dept: FAMILY MEDICINE | Facility: CLINIC | Age: 67
End: 2017-10-10
Payer: COMMERCIAL

## 2017-10-10 VITALS
SYSTOLIC BLOOD PRESSURE: 130 MMHG | HEIGHT: 63 IN | DIASTOLIC BLOOD PRESSURE: 84 MMHG | BODY MASS INDEX: 32.96 KG/M2 | TEMPERATURE: 97.3 F | WEIGHT: 186 LBS | HEART RATE: 52 BPM

## 2017-10-10 DIAGNOSIS — E78.1 HYPERTRIGLYCERIDEMIA: ICD-10-CM

## 2017-10-10 DIAGNOSIS — Z12.31 ENCOUNTER FOR SCREENING MAMMOGRAM FOR BREAST CANCER: ICD-10-CM

## 2017-10-10 DIAGNOSIS — Z00.00 ENCOUNTER FOR ROUTINE ADULT HEALTH EXAMINATION WITHOUT ABNORMAL FINDINGS: Primary | ICD-10-CM

## 2017-10-10 DIAGNOSIS — I10 ESSENTIAL HYPERTENSION WITH GOAL BLOOD PRESSURE LESS THAN 140/90: ICD-10-CM

## 2017-10-10 DIAGNOSIS — Z23 NEED FOR PROPHYLACTIC VACCINATION AND INOCULATION AGAINST INFLUENZA: ICD-10-CM

## 2017-10-10 DIAGNOSIS — R39.15 URGENCY OF URINATION: ICD-10-CM

## 2017-10-10 DIAGNOSIS — N39.41 URGE INCONTINENCE: ICD-10-CM

## 2017-10-10 LAB
ANION GAP SERPL CALCULATED.3IONS-SCNC: 8 MMOL/L (ref 3–14)
BUN SERPL-MCNC: 17 MG/DL (ref 7–30)
CALCIUM SERPL-MCNC: 9.2 MG/DL (ref 8.5–10.1)
CHLORIDE SERPL-SCNC: 107 MMOL/L (ref 94–109)
CHOLEST SERPL-MCNC: 195 MG/DL
CO2 SERPL-SCNC: 28 MMOL/L (ref 20–32)
CREAT SERPL-MCNC: 0.92 MG/DL (ref 0.52–1.04)
GFR SERPL CREATININE-BSD FRML MDRD: 60 ML/MIN/1.7M2
GLUCOSE SERPL-MCNC: 94 MG/DL (ref 70–99)
HDLC SERPL-MCNC: 62 MG/DL
LDLC SERPL CALC-MCNC: 100 MG/DL
NONHDLC SERPL-MCNC: 133 MG/DL
POTASSIUM SERPL-SCNC: 3.9 MMOL/L (ref 3.4–5.3)
SODIUM SERPL-SCNC: 143 MMOL/L (ref 133–144)
TRIGL SERPL-MCNC: 164 MG/DL

## 2017-10-10 PROCEDURE — 99397 PER PM REEVAL EST PAT 65+ YR: CPT | Mod: 25 | Performed by: FAMILY MEDICINE

## 2017-10-10 PROCEDURE — 80061 LIPID PANEL: CPT | Performed by: FAMILY MEDICINE

## 2017-10-10 PROCEDURE — 90662 IIV NO PRSV INCREASED AG IM: CPT | Performed by: FAMILY MEDICINE

## 2017-10-10 PROCEDURE — G0008 ADMIN INFLUENZA VIRUS VAC: HCPCS | Performed by: FAMILY MEDICINE

## 2017-10-10 PROCEDURE — 80048 BASIC METABOLIC PNL TOTAL CA: CPT | Performed by: FAMILY MEDICINE

## 2017-10-10 PROCEDURE — 36415 COLL VENOUS BLD VENIPUNCTURE: CPT | Performed by: FAMILY MEDICINE

## 2017-10-10 RX ORDER — HYDROCHLOROTHIAZIDE 25 MG/1
25 TABLET ORAL DAILY
Qty: 90 TABLET | Refills: 3 | Status: SHIPPED | OUTPATIENT
Start: 2017-10-10 | End: 2018-09-25

## 2017-10-10 RX ORDER — TOLTERODINE 4 MG/1
4 CAPSULE, EXTENDED RELEASE ORAL DAILY
Qty: 90 CAPSULE | Refills: 3 | Status: SHIPPED | OUTPATIENT
Start: 2017-10-10 | End: 2018-09-25

## 2017-10-10 RX ORDER — METOPROLOL SUCCINATE 50 MG/1
75 TABLET, EXTENDED RELEASE ORAL DAILY
Qty: 135 TABLET | Refills: 3 | Status: SHIPPED | OUTPATIENT
Start: 2017-10-10 | End: 2018-02-06

## 2017-10-10 NOTE — PATIENT INSTRUCTIONS
I refilled your medications.    For your blood pressure I am changing the formulation to metoprolol XL which is a once a day medication.  You will be taking 1.5 tabs a day.  If you have well controlled blood pressure and are having side effects of being fatigued, go to taking 1 tablet a day.  Being controlled for blood pressure is less than 140/90.    Please go to lab.    Please get your mammogram done in spring of 2018    Thank you for choosing [facility].  You may be receiving a survey in the mail from [facility] Chambers regarding your visit today.  Please take a few minutes to complete and return the survey to let us know how we are doing.      If you have questions or concerns, please contact us via Listnerd or you can contact your care team at [phone].    Our Clinic hours are:  Monday 6:40 am  to 7:00 pm  Tuesday -Friday 6:40 am to 5:00 pm    The [place] outpatient lab hours are:  Monday - Friday 6:10 am to 4:45 pm  Saturdays 7:00 am to 11:00 am  Appointments are required, call [phone]    If you have clinical questions after hours or would like to schedule an appointment,  call the clinic at [phone].    Preventive Health Recommendations    Female Ages 65 +    Yearly exam:     See your health care provider every year in order to  o Review health changes.   o Discuss preventive care.    o Review your medicines if your doctor has prescribed any.      You no longer need a yearly Pap test unless you've had an abnormal Pap test in the past 10 years. If you have vaginal symptoms, such as bleeding or discharge, be sure to talk with your provider about a Pap test.      Every 1 to 2 years, have a mammogram.  If you are over 69, talk with your health care provider about whether or not you want to continue having screening mammograms.      Every 10 years, have a colonoscopy. Or, have a yearly FIT test (stool test). These exams will check for colon cancer.       Have a cholesterol test every 5 years, or more  often if your doctor advises it.       Have a diabetes test (fasting glucose) every three years. If you are at risk for diabetes, you should have this test more often.       At age 65, have a bone density scan (DEXA) to check for osteoporosis (brittle bone disease).    Shots:    Get a flu shot each year.    Get a tetanus shot every 10 years.    Talk to your doctor about your pneumonia vaccines. There are now two you should receive - Pneumovax (PPSV 23) and Prevnar (PCV 13).    Talk to your doctor about the shingles vaccine.    Talk to your doctor about the hepatitis B vaccine.    Nutrition:     Eat at least 5 servings of fruits and vegetables each day.      Eat whole-grain bread, whole-wheat pasta and brown rice instead of white grains and rice.      Talk to your provider about Calcium and Vitamin D.     Lifestyle    Exercise at least 150 minutes a week (30 minutes a day, 5 days a week). This will help you control your weight and prevent disease.      Limit alcohol to one drink per day.      No smoking.       Wear sunscreen to prevent skin cancer.       See your dentist twice a year for an exam and cleaning.      See your eye doctor every 1 to 2 years to screen for conditions such as glaucoma, macular degeneration and cataracts.

## 2017-10-10 NOTE — NURSING NOTE
"Chief Complaint   Patient presents with     Wellness Visit     is fasting for labs       Initial /84 (Cuff Size: Adult Large)  Pulse 52  Temp 97.3  F (36.3  C) (Tympanic)  Ht 5' 2.5\" (1.588 m)  Wt 186 lb (84.4 kg)  BMI 33.48 kg/m2 Estimated body mass index is 33.48 kg/(m^2) as calculated from the following:    Height as of this encounter: 5' 2.5\" (1.588 m).    Weight as of this encounter: 186 lb (84.4 kg).  Medication Reconciliation: complete  "

## 2017-10-10 NOTE — MR AVS SNAPSHOT
After Visit Summary   10/10/2017    Precious Reyes    MRN: [id number]           Patient Information     Date Of Birth          1950        Visit Information        Provider Department      10/10/2017 9:40 AM Chau Cox MD [facility]        Today's Diagnoses     Encounter for routine adult health examination without abnormal findings    -  1    Hypertriglyceridemia        Essential hypertension with goal blood pressure less than 140/90        Urgency of urination        Urge incontinence        Need for prophylactic vaccination and inoculation against influenza        Encounter for screening mammogram for breast cancer          Care Instructions    I refilled your medications.    For your blood pressure I am changing the formulation to metoprolol XL which is a once a day medication.  You will be taking 1.5 tabs a day.  If you have well controlled blood pressure and are having side effects of being fatigued, go to taking 1 tablet a day.  Being controlled for blood pressure is less than 140/90.    Please go to lab.    Please get your mammogram done in spring of 2018    Thank you for choosing [facility].  You may be receiving a survey in the mail from [facility] regarding your visit today.  Please take a few minutes to complete and return the survey to let us know how we are doing.      If you have questions or concerns, please contact us via Parcel or you can contact your care team at [phone].    Our Clinic hours are:  Monday 6:40 am  to 7:00 pm  Tuesday -Friday 6:40 am to 5:00 pm    The [place] outpatient lab hours are:  Monday - Friday 6:10 am to 4:45 pm  Saturdays 7:00 am to 11:00 am  Appointments are required, call [phone]    If you have clinical questions after hours or would like to schedule an appointment,  call the clinic at [phone].    Preventive Health Recommendations    Female Ages 65 +    Yearly exam:     See your health care provider every year  in order to  o Review health changes.   o Discuss preventive care.    o Review your medicines if your doctor has prescribed any.      You no longer need a yearly Pap test unless you've had an abnormal Pap test in the past 10 years. If you have vaginal symptoms, such as bleeding or discharge, be sure to talk with your provider about a Pap test.      Every 1 to 2 years, have a mammogram.  If you are over 69, talk with your health care provider about whether or not you want to continue having screening mammograms.      Every 10 years, have a colonoscopy. Or, have a yearly FIT test (stool test). These exams will check for colon cancer.       Have a cholesterol test every 5 years, or more often if your doctor advises it.       Have a diabetes test (fasting glucose) every three years. If you are at risk for diabetes, you should have this test more often.       At age 65, have a bone density scan (DEXA) to check for osteoporosis (brittle bone disease).    Shots:    Get a flu shot each year.    Get a tetanus shot every 10 years.    Talk to your doctor about your pneumonia vaccines. There are now two you should receive - Pneumovax (PPSV 23) and Prevnar (PCV 13).    Talk to your doctor about the shingles vaccine.    Talk to your doctor about the hepatitis B vaccine.    Nutrition:     Eat at least 5 servings of fruits and vegetables each day.      Eat whole-grain bread, whole-wheat pasta and brown rice instead of white grains and rice.      Talk to your provider about Calcium and Vitamin D.     Lifestyle    Exercise at least 150 minutes a week (30 minutes a day, 5 days a week). This will help you control your weight and prevent disease.      Limit alcohol to one drink per day.      No smoking.       Wear sunscreen to prevent skin cancer.       See your dentist twice a year for an exam and cleaning.      See your eye doctor every 1 to 2 years to screen for conditions such as glaucoma, macular degeneration and cataracts.        "   Follow-ups after your visit        Follow-up notes from your care team     Return in about 1 year (around 10/10/2018).      Future tests that were ordered for you today     Open Future Orders        Priority Expected Expires Ordered    *[place] Screening Digital Bilateral Routine  10/10/2018 10/10/2017            Who to contact     If you have questions or need follow up information about today's clinic visit or your schedule please contact [facility] directly at [phone].  Normal or non-critical lab and imaging results will be communicated to you by Jigseehart, letter or phone within 4 business days after the clinic has received the results. If you do not hear from us within 7 days, please contact the clinic through Jigseehart or phone. If you have a critical or abnormal lab result, we will notify you by phone as soon as possible.  Submit refill requests through Sentiment or call your pharmacy and they will forward the refill request to us. Please allow 3 business days for your refill to be completed.          Additional Information About Your Visit        Sentiment Information     Sentiment lets you send messages to your doctor, view your test results, renew your prescriptions, schedule appointments and more. To sign up, go to [email or web address] . Click on \"Log in\" on the left side of the screen, which will take you to the Welcome page. Then click on \"Sign up Now\" on the right side of the page.     You will be asked to enter the access code listed below, as well as some personal information. Please follow the directions to create your username and password.     Your access code is: [email or web address]  Expires: 2017  5:32 PM     Your access code will  in 90 days. If you need help or a new code, please call your [facility] clinic or [phone].        Care EveryWhere ID     This is your Care EveryWhere ID. This could be used by other organizations to access your [facility] medical records  [id number]   " "     Your Vitals Were     Pulse Temperature Height BMI (Body Mass Index)          52 97.3  F (36.3  C) (Tympanic) 5' 2.5\" (1.588 m) 33.48 kg/m2         Blood Pressure from Last 3 Encounters:   10/10/17 130/84   09/12/17 157/78   09/08/17 135/72    Weight from Last 3 Encounters:   10/10/17 186 lb (84.4 kg)   09/08/17 185 lb 12.8 oz (84.3 kg)   05/25/17 186 lb 11.2 oz (84.7 kg)              We Performed the Following     Basic metabolic panel     FLU VACCINE, INCREASED ANTIGEN, PRESV FREE, AGE 65+ [37462]     Lipid panel reflex to direct LDL     Vaccine Administration, Initial [19984]          Today's Medication Changes          These changes are accurate as of: 10/10/17 10:27 AM.  If you have any questions, ask your nurse or doctor.               Start taking these medicines.        Dose/Directions    metoprolol 50 MG 24 hr tablet   Commonly known as:  TOPROL-XL   Used for:  Essential hypertension with goal blood pressure less than 140/90   Started by:  Chau Cox MD        Dose:  75 mg   Take 1.5 tablets (75 mg) by mouth daily   Quantity:  135 tablet   Refills:  3         Stop taking these medicines if you haven't already. Please contact your care team if you have questions.     metoprolol 50 MG tablet   Commonly known as:  LOPRESSOR   Stopped by:  Chau Cox MD                Where to get your medicines      These medications were sent to [facility] [address] [place] [address]  [address] [facility] [address]     Phone:  [phone]     hydrochlorothiazide 25 MG tablet    metoprolol 50 MG 24 hr tablet    tolterodine 4 MG 24 hr capsule                Primary Care Provider Office Phone # Fax #    Chau Cox [phone] [address] [facility] [address]        Equal Access to Services     ISAAC LIM AH: Eun arreolao Sorob, waaxda luqadaha, qaybta kaalmada adeegyajacqueline, raymond jaramillo. So [facility] " [phone].    ATENCIÓN: Si sonal saldaña, tiene a farooq disposición servicios gratuitos de asistencia lingüística. Lesvia evangelista [phone].    We comply with applicable federal civil rights laws and [place] laws. We do not discriminate on the basis of race, color, national origin, age, disability, sex, sexual orientation, or gender identity.            Thank you!     Thank you for choosing [facility]  for your care. Our goal is always to provide you with excellent care. Hearing back from our patients is one way we can continue to improve our services. Please take a few minutes to complete the written survey that you may receive in the mail after your visit with us. Thank you!             Your Updated Medication List - Protect others around you: Learn how to safely use, store and throw away your medicines at www.disposemymeds.org.          This list is accurate as of: 10/10/17 10:27 AM.  Always use your most recent med list.                   Brand Name Dispense Instructions for use Diagnosis    aspirin 81 MG tablet     100    ONE DAILY        cetirizine 10 MG tablet    zyrTEC     Take 10 mg by mouth daily        hydrochlorothiazide 25 MG tablet    HYDRODIURIL    90 tablet    Take 1 tablet (25 mg) by mouth daily    Essential hypertension with goal blood pressure less than 140/90       ibuprofen 200 MG tablet    ADVIL/MOTRIN     TAKE 1 TO 2 TABLETS EVERY 4 TO 6 HOURS AS NEEDED WITH FOOD        metoprolol 50 MG 24 hr tablet    TOPROL-XL    135 tablet    Take 1.5 tablets (75 mg) by mouth daily    Essential hypertension with goal blood pressure less than 140/90       order for DME     1    needs have cpap machine and mask, last one 10 years old, not fitting right    LOLY (obstructive sleep apnea)       tolterodine 4 MG 24 hr capsule    DETROL LA    90 capsule    Take 1 capsule (4 mg) by mouth daily    Urgency of urination, Urge incontinence       VITAMIN D PO      Take 1,000 Units by mouth daily

## 2017-10-10 NOTE — PROGRESS NOTES
Injectable Influenza Immunization Documentation    1.  Is the person to be vaccinated sick today?   No    2. Does the person to be vaccinated have an allergy to a component   of the vaccine?   No    3. Has the person to be vaccinated ever had a serious reaction   to influenza vaccine in the past?   No    4. Has the person to be vaccinated ever had Guillain-Barré syndrome?   No    Form completed by OSKAR Nava ([facility])         SUBJECTIVE:   Precious Reyes is a 67 year old female who presents for Preventive Visit.  Are you in the first 12 months of your Medicare Part B coverage?  No    Healthy Habits:    Do you get at least three servings of calcium containing foods daily (dairy, green leafy vegetables, etc.)? yes    Amount of exercise or daily activities, outside of work: not much lately    Problems taking medications regularly No    Medication side effects: Yes is more fatigued on the metoprolol, liked the atenolol better    Have you had an eye exam in the past two years? yes    Do you see a dentist twice per year? yes    Do you have sleep apnea, excessive snoring or daytime drowsiness?uses C-pap    COGNITIVE SCREEN  1) Repeat 3 items (Banana, Sunrise, Chair)    2) Clock draw: NORMAL  3) 3 item recall: Recalls 3 objects  Results: 3 items recalled: COGNITIVE IMPAIRMENT LESS LIKELY    Mini-CogTM Copyright S Greta. Licensed by the author for use in [facility]; reprinted with permission (ramiro@.[facility]). All rights reserved.            Reviewed and updated as needed this visit by clinical staffTobacco  Allergies  Med Hx  Surg Hx  Fam Hx  Soc Hx        Reviewed and updated as needed this visit by Provider        Social History   Substance Use Topics     Smoking status: Never Smoker     Smokeless tobacco: Never Used     Alcohol use Yes      Comment: 0-2 drinks per week       The patient does not drink >3 drinks per day nor >7 drinks per week.    Today's PHQ-2 Score:   PHQ-2 ( 1999 Pfizer) 10/12/2016  10/5/2015   Q1: Little interest or pleasure in doing things 0 0   Q2: Feeling down, depressed or hopeless 0 0   PHQ-2 Score 0 0       Do you feel safe in your environment - Yes    Do you have a Health Care Directive?: Yes: Advance Directive has been received and scanned.    Current providers sharing in care for this patient include:   Patient Care Team:  Chau Cox MD as PCP - General  Tiesha Garcia PA as Physician Assistant (Physician Assistant)      Hearing impairment: Yes, has seen audiology, has known deficit in right ear    Ability to successfully perform activities of daily living: Yes, no assistance needed     Fall risk:  Fallen 2 or more times in the past year?: No  Any fall with injury in the past year?: No      Home safety:  lack of grab bars in the bathroom      The following health maintenance items are reviewed in Epic and correct as of today:Health Maintenance   Topic Date Due     SKIN CANCER SCREENING Q6 MOS (NO IN BASKET)  1950     HEPATITIS C SCREENING  08/27/1967     SKIN CANCER SCREENING Q1 YR (NO IN BASKET)  07/15/2016     ADVANCE DIRECTIVE PLANNING Q5 YRS  06/06/2017     INFLUENZA VACCINE (SYSTEM ASSIGNED)  09/01/2017     FALL RISK ASSESSMENT  10/12/2017     TETANUS IMMUNIZATION (SYSTEM ASSIGNED)  09/16/2018     MAMMO SCREEN Q2 YR (SYSTEM ASSIGNED)  10/07/2018     COLONOSCOPY Q10 YR  10/01/2019     LIPID SCREEN Q5 YR FEMALE (SYSTEM ASSIGNED)  10/12/2021     DEXA SCAN SCREENING (SYSTEM ASSIGNED)  Completed     PNEUMOCOCCAL  Completed             ROS:  Review Of Systems  Skin: negative  Eyes: negative  Ears/Nose/Throat: negative  Respiratory: No shortness of breath, dyspnea on exertion, cough, or hemoptysis  Cardiovascular: negative  Gastrointestinal: negative  Genitourinary: negative  Musculoskeletal: negative  Neurologic: negative  Psychiatric: negative  Hematologic/Lymphatic/Immunologic: negative  Endocrine: negative      OBJECTIVE:   /84 (Cuff Size: Adult Large)   "Pulse 52  Temp 97.3  F (36.3  C) (Tympanic)  Ht 5' 2.5\" (1.588 m)  Wt 186 lb (84.4 kg)  BMI 33.48 kg/m2 Estimated body mass index is 33.48 kg/(m^2) as calculated from the following:    Height as of this encounter: 5' 2.5\" (1.588 m).    Weight as of this encounter: 186 lb (84.4 kg).  EXAM:   GENERAL: healthy, alert and no distress  EYES: Eyes grossly normal to inspection, PERRL and conjunctivae and sclerae normal  HENT: ear canals and TM's normal, nose and mouth without ulcers or lesions  NECK: no adenopathy, no asymmetry, masses, or scars and thyroid normal to palpation  RESP: lungs clear to auscultation - no rales, rhonchi or wheezes  BREAST: NE  CV: regular rate and rhythm, normal S1 S2, no S3 or S4, no murmur, click or rub, no peripheral edema and peripheral pulses strong  ABDOMEN: soft, nontender, no hepatosplenomegaly, no masses and bowel sounds normal   (female): NE  MS: no gross musculoskeletal defects noted, no edema  SKIN: no suspicious lesions or rashes  NEURO: Normal strength and tone, mentation intact and speech normal  PSYCH: mentation appears normal, affect normal/bright  LYMPH: anterior cervical: no adenopathy  posterior cervical: no adenopathy    ASSESSMENT / PLAN:   (Z00.00) Encounter for routine adult health examination without abnormal findings  (primary encounter diagnosis)  Comment: Discussed healthy lifestyle and preventative cares.    Plan:     (E78.1) Hypertriglyceridemia  Comment: check levels  Plan: Lipid panel reflex to direct LDL            (I10) Essential hypertension with goal blood pressure less than 140/90  Comment: check for side effects  Plan: hydrochlorothiazide (HYDRODIURIL) 25 MG tablet,        metoprolol (TOPROL-XL) 50 MG 24 hr tablet        Refilled med, changed betablocker to extended release verses immediate release    (R39.15) Urgency of urination  Comment: refilled med  Plan: tolterodine (DETROL LA) 4 MG 24 hr capsule            (N39.41) Urge incontinence  Comment: " "  Plan: tolterodine (DETROL LA) 4 MG 24 hr capsule            (Z23) Need for prophylactic vaccination and inoculation against influenza  Comment:   Plan: FLU VACCINE, INCREASED ANTIGEN, PRESV FREE, AGE        65+ [35344], Vaccine Administration, Initial         [24226]            (Z12.31) Encounter for screening mammogram for breast cancer  Comment:   Plan: *[place] Screening Digital Bilateral              End of Life Planning:  Patient currently has an advanced directive:     COUNSELING:  Reviewed preventive health counseling, as reflected in patient instructions       Regular exercise       Healthy diet/nutrition       Vision screening       Hearing screening       Dental care        Estimated body mass index is 33.48 kg/(m^2) as calculated from the following:    Height as of this encounter: 5' 2.5\" (1.588 m).    Weight as of this encounter: 186 lb (84.4 kg).  Weight management plan: diet and exercise   reports that she has never smoked. She has never used smokeless tobacco.        Appropriate preventive services were discussed with this patient, including applicable screening as appropriate for cardiovascular disease, diabetes, osteopenia/osteoporosis, and glaucoma.  As appropriate for age/gender, discussed screening for colorectal cancer, prostate cancer, breast cancer, and cervical cancer. Checklist reviewing preventive services available has been given to the patient.    Reviewed patients plan of care and provided an AVS. The Basic Care Plan (routine screening as documented in Health Maintenance) for Precious meets the Care Plan requirement. This Care Plan has been established and reviewed with the Patient.    Counseling Resources:  ATP IV Guidelines  Pooled Cohorts Equation Calculator  Breast Cancer Risk Calculator  FRAX Risk Assessment  ICSI Preventive Guidelines  Dietary Guidelines for Americans, 2010  USDA's MyPlate  ASA Prophylaxis  Lung CA Screening    Chau Cox MD  [facility]  "

## 2018-02-06 ENCOUNTER — TELEPHONE (OUTPATIENT)
Dept: FAMILY MEDICINE | Facility: CLINIC | Age: 68
End: 2018-02-06

## 2018-02-06 DIAGNOSIS — I10 ESSENTIAL HYPERTENSION: Primary | ICD-10-CM

## 2018-02-06 RX ORDER — ATENOLOL 50 MG/1
75 TABLET ORAL DAILY
Qty: 135 TABLET | Refills: 3 | Status: SHIPPED | OUTPATIENT
Start: 2018-02-06 | End: 2018-09-25

## 2018-02-06 NOTE — TELEPHONE ENCOUNTER
Reason for Call:  Medication or medication refill:    Do you use a [facility]?  Name of the pharmacy and phone number for the current request:  [facility] [place], [place]    Name of the medication requested: Atenolol    Other request: She was on Atenolol before then the shortage.  She talked to the pharmacy and they have Atenolol back in stock.  She is wondering if she can go back on the Atenolol?      Can we leave a detailed message on this number? YES    Phone number patient can be reached at: Cell number on file:    Telephone Information:   Mobile [phone]       Best Time: any    Call taken on 2/6/2018 at 11:02 AM by Maureen Heard

## 2018-02-06 NOTE — TELEPHONE ENCOUNTER
Is she having side effects? How is her pressure? Left message for patient to return call  Melania Frazier RN

## 2018-02-06 NOTE — TELEPHONE ENCOUNTER
Pt returning call, ok to call back.    [facility]3] Station

## 2018-02-06 NOTE — TELEPHONE ENCOUNTER
S-(situation): medication request    B-(background): with the national shortage we changed her to metoprolol. Now she wants to change back to atenolol due to side effects    A-(assessment): patient states she is really tired from her medications. She is 1.5 tablets of the metoprolol. She doesn't like metoprolol. Having headaches with the medication, too. She tried to cut back to one tablet daily and that didn't help with the fatigue or the headaches. She talked to the pharmacy and they have a stock of atenolol again. She wants to try that again because she had good results from it.  She comes home from [place] in May.     R-(recommendations): I have pended the medication. Please sign if appropriate

## 2018-02-07 NOTE — TELEPHONE ENCOUNTER
Patient notified of Rx for atenolol sent to pharmacy and to stop taking metoprolol per MD recommendations  Patient verbalized understanding and agreed with MD's plan      Veronica BENITO Rn

## 2018-05-02 ENCOUNTER — OFFICE VISIT (OUTPATIENT)
Dept: FAMILY MEDICINE | Facility: CLINIC | Age: 68
End: 2018-05-02
Payer: COMMERCIAL

## 2018-05-02 VITALS
DIASTOLIC BLOOD PRESSURE: 80 MMHG | WEIGHT: 182 LBS | SYSTOLIC BLOOD PRESSURE: 128 MMHG | TEMPERATURE: 97.9 F | BODY MASS INDEX: 32.25 KG/M2 | HEART RATE: 68 BPM | HEIGHT: 63 IN

## 2018-05-02 DIAGNOSIS — N39.0 URINARY TRACT INFECTION WITHOUT HEMATURIA, SITE UNSPECIFIED: ICD-10-CM

## 2018-05-02 DIAGNOSIS — R39.89 ABNORMAL URINE COLOR: ICD-10-CM

## 2018-05-02 DIAGNOSIS — N81.10 VAGINAL PROLAPSE WITHOUT UTERINE PROLAPSE: Primary | ICD-10-CM

## 2018-05-02 DIAGNOSIS — R82.90 NONSPECIFIC FINDING ON EXAMINATION OF URINE: ICD-10-CM

## 2018-05-02 LAB
ALBUMIN UR-MCNC: NEGATIVE MG/DL
APPEARANCE UR: CLEAR
BACTERIA #/AREA URNS HPF: ABNORMAL /HPF
BILIRUB UR QL STRIP: NEGATIVE
COLOR UR AUTO: YELLOW
GLUCOSE UR STRIP-MCNC: NEGATIVE MG/DL
HGB UR QL STRIP: ABNORMAL
KETONES UR STRIP-MCNC: NEGATIVE MG/DL
LEUKOCYTE ESTERASE UR QL STRIP: ABNORMAL
NITRATE UR QL: POSITIVE
NON-SQ EPI CELLS #/AREA URNS LPF: ABNORMAL /LPF
PH UR STRIP: 5.5 PH (ref 5–7)
RBC #/AREA URNS AUTO: ABNORMAL /HPF
SOURCE: ABNORMAL
SP GR UR STRIP: 1.02 (ref 1–1.03)
UROBILINOGEN UR STRIP-ACNC: 0.2 EU/DL (ref 0.2–1)
WBC #/AREA URNS AUTO: ABNORMAL /HPF

## 2018-05-02 PROCEDURE — 81001 URINALYSIS AUTO W/SCOPE: CPT | Performed by: FAMILY MEDICINE

## 2018-05-02 PROCEDURE — 99214 OFFICE O/P EST MOD 30 MIN: CPT | Performed by: FAMILY MEDICINE

## 2018-05-02 PROCEDURE — 87088 URINE BACTERIA CULTURE: CPT | Performed by: FAMILY MEDICINE

## 2018-05-02 PROCEDURE — 87086 URINE CULTURE/COLONY COUNT: CPT | Performed by: FAMILY MEDICINE

## 2018-05-02 PROCEDURE — 87186 SC STD MICRODIL/AGAR DIL: CPT | Performed by: FAMILY MEDICINE

## 2018-05-02 RX ORDER — CIPROFLOXACIN 500 MG/1
500 TABLET, FILM COATED ORAL 2 TIMES DAILY
Qty: 6 TABLET | Refills: 0 | Status: SHIPPED | OUTPATIENT
Start: 2018-05-02 | End: 2018-07-11

## 2018-05-02 NOTE — MR AVS SNAPSHOT
After Visit Summary   5/2/2018    Precious Reyes    MRN: [id number]           Patient Information     Date Of Birth          1950        Visit Information        Provider Department      5/2/2018 11:20 AM Chau Cox MD [facility]        Today's Diagnoses     Vaginal prolapse without uterine prolapse    -  1    Abnormal urine color          Care Instructions    Please see OB/GYN for what sound to be your prolapse.    Please give a urine specimen.          Thank you for choosing [facility].  You may be receiving a survey in the mail from Tony Chambers regarding your visit today.  Please take a few minutes to complete and return the survey to let us know how we are doing.      If you have questions or concerns, please contact us via [employer] or you can contact your care team at [phone].    Our Clinic hours are:  Monday 6:40 am  to 7:00 pm  Tuesday -Friday 6:40 am to 5:00 pm    The [place] outpatient lab hours are:  Monday - Friday 6:10 am to 4:45 pm  Saturdays 7:00 am to 11:00 am  Appointments are required, call [phone]    If you have clinical questions after hours or would like to schedule an appointment,  call the clinic at [phone].            Follow-ups after your visit        Additional Services     OB/GYN REFERRAL       Your provider has referred you to:  [facility]: [facility] [phone]   [email or web address]    Please be aware that coverage of these services is subject to the terms and limitations of your health insurance plan.  Call member services at your health plan with any benefit or coverage questions.      Please bring the following with you to your appointment:    (1) Any X-Rays, CTs or MRIs which have been performed.  Contact the facility where they were done to arrange for  prior to your scheduled appointment.   (2) List of current medications   (3) This referral request   (4) Any documents/labs  "given to you for this referral    Patient reports that for 3 weeks vaginal prolapse symptoms.  When she showers daily or so, she has to push the vaginal prolapse back in.  No pain.                  Your next 10 appointments already scheduled     May 08, 2018 12:45 PM CDT   MA SCREENING DIGITAL BILATERAL with [id number]   [facility] ([facility])    [address]  [facility] [address]   [phone]           Do not use any powder, lotion or deodorant under your arms or on your breast. If you do, we will ask you to remove it before your exam.  Wear comfortable, two-piece clothing.  If you have any allergies, tell your care team.  Bring any previous mammograms from other facilities or have them mailed to the breast center. Three-dimensional (3D) mammograms are available at [place] locations in [facility], [facility], [facility], and [place]. [facility] locations include [place] and [facility] in [place]. Benefits of 3D mammograms include: - Improved rate of cancer detection - Decreases your chance of having to go back for more tests, which means fewer: - \"False-positive\" results (This means that there is an abnormal area but it isn't cancer.) - Invasive testing procedures, such as a biopsy or surgery - Can provide clearer images of the breast if you have dense breast tissue. 3D mammography is an optional exam that anyone can have with a 2D mammogram. It doesn't replace or take the place of a 2D mammogram. 2D mammograms remain an effective screening test for all women.  Not all insurance companies cover the cost of a 3D mammogram. Check with your insurance.              Who to contact     If you have questions or need follow up information about today's clinic visit or your schedule please contact [facility] directly at [phone].  Normal or non-critical lab and imaging results will be communicated to you by " "MyChart, letter or phone within 4 business days after the clinic has received the results. If you do not hear from us within 7 days, please contact the clinic through [employer] or phone. If you have a critical or abnormal lab result, we will notify you by phone as soon as possible.  Submit refill requests through [employer] or call your pharmacy and they will forward the refill request to us. Please allow 3 business days for your refill to be completed.          Additional Information About Your Visit        [email or web address] Information     [employer] lets you send messages to your doctor, view your test results, renew your prescriptions, schedule appointments and more. To sign up, go to [email or web address] . Click on \"Log in\" on the left side of the screen, which will take you to the Welcome page. Then click on \"Sign up Now\" on the right side of the page.     You will be asked to enter the access code listed below, as well as some personal information. Please follow the directions to create your username and password.     Your access code is: [email or web address]  Expires: 2018 11:59 AM     Your access code will  in 90 days. If you need help or a new code, please call your [place] clinic or [phone].        Care EveryWhere ID     This is your Care EveryWhere ID. This could be used by other organizations to access your [place] medical records  [id number]        Your Vitals Were     Pulse Temperature Height BMI (Body Mass Index)          68 97.9  F (36.6  C) (Tympanic) 5' 2.5\" (1.588 m) 32.76 kg/m2         Blood Pressure from Last 3 Encounters:   18 128/80   10/10/17 130/84   17 157/78    Weight from Last 3 Encounters:   18 182 lb (82.6 kg)   10/10/17 186 lb (84.4 kg)   17 185 lb 12.8 oz (84.3 kg)              We Performed the Following     OB/GYN REFERRAL     UA reflex to Microscopic and Culture        Primary Care Provider Office Phone # Fax #    Chau Cox [phone] "       [address] [facility] [address]        Equal Access to Services     ISAAC LIM : Hadii aad ku hadluis angelalec Tan, watrida aliya, qarameshcolin estebanmajacqueline breen, raymond jaramillo. So [facility] [phone].    ATENCIÓN: Si habla español, tiene a farooq disposición servicios gratuitos de asistencia lingüística. Llame al [phone].    We comply with applicable federal civil rights laws and [place] laws. We do not discriminate on the basis of race, color, national origin, age, disability, sex, sexual orientation, or gender identity.            Thank you!     Thank you for choosing [facility]  for your care. Our goal is always to provide you with excellent care. Hearing back from our patients is one way we can continue to improve our services. Please take a few minutes to complete the written survey that you may receive in the mail after your visit with us. Thank you!             Your Updated Medication List - Protect others around you: Learn how to safely use, store and throw away your medicines at www.disposemymeds.org.          This list is accurate as of 5/2/18 12:00 PM.  Always use your most recent med list.                   Brand Name Dispense Instructions for use Diagnosis    aspirin 81 MG tablet     100    ONE DAILY        atenolol 50 MG tablet    TENORMIN    135 tablet    Take 1.5 tablets (75 mg) by mouth daily    Essential hypertension       cetirizine 10 MG tablet    zyrTEC     Take 10 mg by mouth daily        estrogens (conjugated) 0.625 MG tablet    PREMARIN     Take 0.625 mg by mouth daily        hydrochlorothiazide 25 MG tablet    HYDRODIURIL    90 tablet    Take 1 tablet (25 mg) by mouth daily    Essential hypertension with goal blood pressure less than 140/90       ibuprofen 200 MG tablet    ADVIL/MOTRIN     TAKE 1 TO 2 TABLETS EVERY 4 TO 6 HOURS AS NEEDED WITH FOOD        order for DME     1    needs have cpap machine and mask, last one 10 years old, not  fitting right    LOLY (obstructive sleep apnea)       tolterodine 4 MG 24 hr capsule    DETROL LA    90 capsule    Take 1 capsule (4 mg) by mouth daily    Urgency of urination, Urge incontinence       VITAMIN D PO      Take 1,000 Units by mouth daily

## 2018-05-02 NOTE — NURSING NOTE
"Chief Complaint   Patient presents with     prolapse     has vaginal protrusion. No pain, but uncomfortable. Urine seems cloudy, but no dysuria. No fever.       Initial /80 (Cuff Size: Adult Large)  Pulse 68  Temp 97.9  F (36.6  C) (Tympanic)  Ht 5' 2.5\" (1.588 m)  Wt 182 lb (82.6 kg)  BMI 32.76 kg/m2 Estimated body mass index is 32.76 kg/(m^2) as calculated from the following:    Height as of this encounter: 5' 2.5\" (1.588 m).    Weight as of this encounter: 182 lb (82.6 kg).  Medication Reconciliation: complete  "

## 2018-05-02 NOTE — PATIENT INSTRUCTIONS
Please see OB/GYN for what sound to be your prolapse.    Please give a urine specimen.          Thank you for choosing [facility]  You may be receiving a survey in the mail from Tony Chambers regarding your visit today.  Please take a few minutes to complete and return the survey to let us know how we are doing.      If you have questions or concerns, please contact us via [employer] or you can contact your care team at [phone].    Our Clinic hours are:  Monday 6:40 am  to 7:00 pm  Tuesday -Friday 6:40 am to 5:00 pm    The [place] outpatient lab hours are:  Monday - Friday 6:10 am to 4:45 pm  Saturdays 7:00 am to 11:00 am  Appointments are required, call [phone]    If you have clinical questions after hours or would like to schedule an appointment,  call the clinic at [phone].

## 2018-05-02 NOTE — PROGRESS NOTES
"  SUBJECTIVE:   Precious Reyes is a 67 year old female who presents to clinic today for the following health issues:  Chief Complaint   Patient presents with     prolapse     has vaginal protrusion. No pain, but uncomfortable. Urine seems cloudy, but no dysuria. No fever.     Medication Reconciliation     a [facility] doctor gave her estrogen and was told to discuss with her doctor.       Patient has for the past three weeks having something coming out of her vagina.  No pain.  Just pressure.  No fever or chills.  No vaginal discharge.  She was seen in [place].  She was seen by an doctor there.  She had a few UTIs too.  She did get them cleared up.  However she state her urine recently seems to be a little cloudy.  No dysuria.  No hematuria.  She reports the thing coming out her vaginal she pushes back in.  Never had this before.  H/o hysterectomy.          Problem list and histories reviewed & adjusted, as indicated.  Additional history: as documented        Reviewed and updated as needed this visit by clinical staff  Allergies       Reviewed and updated as needed this visit by Provider         ROS:  CONSTITUTIONAL:NEGATIVE for fever, chills, change in weight  INTEGUMENTARY/SKIN: NEGATIVE for worrisome rashes, moles or lesions  GI: NEGATIVE for nausea, abdominal pain, heartburn, or change in bowel habits  : as above  MUSCULOSKELETAL: NEGATIVE for significant arthralgias or myalgia  NEURO: NEGATIVE for weakness, dizziness or paresthesias  PSYCHIATRIC: NEGATIVE for changes in mood or affect    OBJECTIVE:                                                    /80 (Cuff Size: Adult Large)  Pulse 68  Temp 97.9  F (36.6  C) (Tympanic)  Ht 5' 2.5\" (1.588 m)  Wt 182 lb (82.6 kg)  BMI 32.76 kg/m2  Body mass index is 32.76 kg/(m^2).  GENERAL APPEARANCE: alert, no distress and cooperative   (female): pelvic exam, noted no cervix, normal vaginal discharge.  Seems to have a mild vaginal prolapse with straining  MS: " extremities normal- no gross deformities noted  SKIN: no suspicious lesions or rashes  NEURO: Normal strength and tone, mentation intact and speech normal  PSYCH: mentation appears normal and affect normal/bright    Results for orders placed or performed in visit on 05/02/18   UA reflex to Microscopic and Culture   Result Value Ref Range    Color Urine Yellow     Appearance Urine Clear     Glucose Urine Negative NEG^Negative mg/dL    Bilirubin Urine Negative NEG^Negative    Ketones Urine Negative NEG^Negative mg/dL    Specific Gravity Urine 1.020 1.003 - 1.035    Blood Urine Trace (A) NEG^Negative    pH Urine 5.5 5.0 - 7.0 pH    Protein Albumin Urine Negative NEG^Negative mg/dL    Urobilinogen Urine 0.2 0.2 - 1.0 EU/dL    Nitrite Urine Positive (A) NEG^Negative    Leukocyte Esterase Urine Moderate (A) NEG^Negative    Source Midstream Urine    Urine Microscopic   Result Value Ref Range    WBC Urine  (A) OTO5^0 - 5 /HPF    RBC Urine O - 2 OTO2^O - 2 /HPF    Squamous Epithelial /LPF Urine Moderate (A) FEW^Few /LPF    Bacteria Urine Many (A) NEG^Negative /HPF          ASSESSMENT/PLAN:                                                    1. Vaginal prolapse without uterine prolapse  Get a consult from specialist  - OB/GYN REFERRAL  - Urine Microscopic    2. Abnormal urine color  Noted and treat with abx, UTI noted on micro  - UA reflex to Microscopic and Culture    3. Nonspecific finding on examination of urine    - Urine Culture Aerobic Bacterial    4. Urinary tract infection without hematuria, site unspecified    - ciprofloxacin (CIPRO) 500 MG tablet; Take 1 tablet (500 mg) by mouth 2 times daily  Dispense: 6 tablet; Refill: 0      See Patient Instructions    Chau Cox MD  [facility]

## 2018-05-04 LAB
BACTERIA SPEC CULT: ABNORMAL
Lab: ABNORMAL
SPECIMEN SOURCE: ABNORMAL

## 2018-05-04 RX ORDER — NITROFURANTOIN 25; 75 MG/1; MG/1
100 CAPSULE ORAL 2 TIMES DAILY
Qty: 14 CAPSULE | Refills: 0 | Status: SHIPPED | OUTPATIENT
Start: 2018-05-04 | End: 2018-07-11

## 2018-05-08 ENCOUNTER — HOSPITAL ENCOUNTER (OUTPATIENT)
Dept: MAMMOGRAPHY | Facility: CLINIC | Age: 68
Discharge: HOME | End: 2018-05-08
Attending: FAMILY MEDICINE | Admitting: FAMILY MEDICINE
Payer: MEDICARE

## 2018-05-08 DIAGNOSIS — Z12.31 ENCOUNTER FOR SCREENING MAMMOGRAM FOR BREAST CANCER: ICD-10-CM

## 2018-05-08 PROCEDURE — 77063 BREAST TOMOSYNTHESIS BI: CPT

## 2018-06-04 ENCOUNTER — OFFICE VISIT (OUTPATIENT)
Dept: OBGYN | Facility: CLINIC | Age: 68
End: 2018-06-04
Payer: COMMERCIAL

## 2018-06-04 VITALS
HEIGHT: 63 IN | HEART RATE: 52 BPM | RESPIRATION RATE: 18 BRPM | DIASTOLIC BLOOD PRESSURE: 79 MMHG | BODY MASS INDEX: 31.36 KG/M2 | WEIGHT: 177 LBS | SYSTOLIC BLOOD PRESSURE: 149 MMHG | TEMPERATURE: 97.1 F

## 2018-06-04 DIAGNOSIS — N81.6 RECTOCELE: Primary | ICD-10-CM

## 2018-06-04 LAB
ALBUMIN UR-MCNC: NEGATIVE MG/DL
APPEARANCE UR: CLEAR
BILIRUB UR QL STRIP: NEGATIVE
COLOR UR AUTO: YELLOW
GLUCOSE UR STRIP-MCNC: NEGATIVE MG/DL
HGB UR QL STRIP: NEGATIVE
KETONES UR STRIP-MCNC: NEGATIVE MG/DL
LEUKOCYTE ESTERASE UR QL STRIP: NEGATIVE
NITRATE UR QL: NEGATIVE
PH UR STRIP: 6 PH (ref 5–7)
SOURCE: NORMAL
SP GR UR STRIP: 1.01 (ref 1–1.03)
UROBILINOGEN UR STRIP-ACNC: 0.2 EU/DL (ref 0.2–1)

## 2018-06-04 PROCEDURE — 81003 URINALYSIS AUTO W/O SCOPE: CPT | Performed by: OBSTETRICS & GYNECOLOGY

## 2018-06-04 PROCEDURE — 99203 OFFICE O/P NEW LOW 30 MIN: CPT | Performed by: OBSTETRICS & GYNECOLOGY

## 2018-06-04 RX ORDER — OXYCODONE HCL 10 MG/1
10 TABLET, FILM COATED, EXTENDED RELEASE ORAL ONCE
Status: CANCELLED | OUTPATIENT
Start: 2018-06-04 | End: 2018-06-04

## 2018-06-04 NOTE — PROGRESS NOTES
"Precious is a 67 year old   female who presents for advice regarding vaginal wall prolapse; she has a h/o TOTAL VAGINAL HYSTERECTOMY-bilateral salpingo-oophorectomy and \"Lynx\" pubovaginal sling in ; for past 2 months she can feel protrusion from the vagina, reducible; not causing urinary hesitancy, feels lilke she empties well; no incontinence; no constipation  She was seen in [place] and given a course of po estrogen for prevention of UTI; she uses Tolterodine for OAB type symptoms..    Patient Active Problem List    Diagnosis Date Noted     Hypertriglyceridemia 10/13/2015     Priority: Medium     OA (osteoarthritis) of finger, unspecified laterality 10/05/2015     Priority: Medium     Advanced directives, counseling/discussion 2012     Priority: Medium     Discussed advance care planning with patient; information given to patient to review. 2012    Advanced Care Directive received, validated and sent to [facility] for scanning. 2012 Nevaeh Regalado RN           CARDIOVASCULAR SCREENING; LDL GOAL LESS THAN 130 10/31/2010     Priority: Medium     Disease of jaw 2007     Priority: Medium     Problem list name updated by automated process. Provider to review       Abnormal weight gain 2007     Priority: Medium     Disorder of bone and cartilage 2007     Priority: Medium     Problem list name updated by automated process. Provider to review       Actinic keratosis 2006     Priority: Medium     Dorsum left foot 4 mm.       RIGHT KNEE PAIN AND LUMP 2006     Priority: Medium     Fell / trying to catch grandchild from going into the street.  Landed on knees and had large hematomas, left worse than right.   left knee swollen with bony lump on patella, no pain or difficulty moving or bending.       RECTAL PAIN 2006     Priority: Medium     Other malignant neoplasm of skin, site unspecified 2005     Priority: Medium     Non-melanoma skin cancer, Pt is checking to learn " what  kind of cancer  Problem list name updated by automated process. Provider to review and confirm       Essential hypertension 05/03/2005     Priority: Medium     atenolol  Problem list name updated by automated process. Provider to review       Urinary incontinence 05/03/2005     Priority: Medium     detrol 7/04  Problem list name updated by automated process. Provider to review         All systems were reviewed and pertinent information in noted in subjective/HPI.    Past Medical History:   Diagnosis Date     Cholecystitis, unspecified 9/05     Malignant melanoma (H)      Other malignant neoplasm of skin, site unspecified ??????1995    Non-melanoma skin cancer, Pt is checking to learn what  kind of cancer     Unspecified essential hypertension     atenolol     URINARY INCONTINENCE 5/3/2005    detrol 7/04     Urinary tract infection, site not specified 7/04    Recurrent UTI's       Past Surgical History:   Procedure Laterality Date     BIOPSY BREAST       COLONOSCOPY  age 49    repeat 10 years, done at outside hospital     HC EXCISION BREAST LESION, OPEN >=1      5 biopsies all neg     HC REMOVAL GALLBLADDER  9 05    Cholecystectomy     HYSTERECTOMY, PAP NO LONGER INDICATED  07/2004    TVH-BSO, Lynx Pubovaginal sling         Current Outpatient Prescriptions:      ASPIRIN 81 MG OR TABS, ONE DAILY, Disp: 100, Rfl: 3     atenolol (TENORMIN) 50 MG tablet, Take 1.5 tablets (75 mg) by mouth daily, Disp: 135 tablet, Rfl: 3     cetirizine (ZYRTEC) 10 MG tablet, Take 10 mg by mouth daily, Disp: , Rfl:      ciprofloxacin (CIPRO) 500 MG tablet, Take 1 tablet (500 mg) by mouth 2 times daily (Patient not taking: Reported on 6/4/2018), Disp: 6 tablet, Rfl: 0     estrogens, conjugated, (PREMARIN) 0.625 MG tablet, Take 0.625 mg by mouth daily, Disp: , Rfl:      hydrochlorothiazide (HYDRODIURIL) 25 MG tablet, Take 1 tablet (25 mg) by mouth daily, Disp: 90 tablet, Rfl: 3     IBUPROFEN 200 MG OR TABS, TAKE 1 TO 2 TABLETS EVERY 4  "TO 6 HOURS AS NEEDED WITH FOOD, Disp: , Rfl: 0     nitroFURantoin, macrocrystal-monohydrate, (MACROBID) 100 MG capsule, Take 1 capsule (100 mg) by mouth 2 times daily (Patient not taking: Reported on 2018), Disp: 14 capsule, Rfl: 0     ORDER FOR DME, needs have cpap machine and mask, last one 10 years old, not fitting right, Disp: 1, Rfl: 0     tolterodine (DETROL LA) 4 MG 24 hr capsule, Take 1 capsule (4 mg) by mouth daily, Disp: 90 capsule, Rfl: 3     VITAMIN D PO, Take 1,000 Units by mouth daily , Disp: , Rfl:     ALLERGIES:  Adhesive tape; Latex; and Sulfa drugs    Social History     Social History     Marital status:      Spouse name: N/A     Number of children: N/A     Years of education: N/A     Social History Main Topics     Smoking status: Never Smoker     Smokeless tobacco: Never Used     Alcohol use Yes      Comment: 0-2 drinks per week     Drug use: No     Sexual activity: Yes     Partners: Male     Birth control/ protection: Surgical      Comment: ERENDIRA, 2004     Other Topics Concern      Service No     Blood Transfusions No     Caffeine Concern Yes     1 cup tea     Occupational Exposure No     Hobby Hazards No     Sleep Concern No     Stress Concern No     Weight Concern Yes     Special Diet No     Back Care No     Exercise No     Bike Helmet No     Seat Belt Yes     Self-Exams Yes     Parent/Sibling W/ Cabg, Mi Or Angioplasty Before 65f 55m? No     Social History Narrative       Family History   Problem Relation Age of Onset     HEART DISEASE Mother       78     Alcohol/Drug Mother      alcholism     Obesity Mother      HEART DISEASE Father      valve is \"leaky\"     Allergies Brother      Arthritis Brother      Allergies Sister      Thyroid Disease Sister      Arthritis Sister      Lupus     Neurologic Disorder Sister      Bipolar     DIABETES Son      Allergies Sister      Thyroid Disease Sister      Thyroid Disease Sister      Thyroid Disease Sister      HEART DISEASE " "Sister      valve \"leaky\"     Cancer - colorectal No family hx of      Breast Cancer No family hx of      Endometrial Cancer No family hx of      Colon Cancer No family hx of      Ovarian Cancer No family hx of        OBJECTIVE:  Vitals: /79 (BP Location: Right arm, Patient Position: Chair, Cuff Size: Adult Small)  Pulse 52  Temp 97.1  F (36.2  C) (Tympanic)  Resp 18  Ht 5' 2.5\" (1.588 m)  Wt 177 lb (80.3 kg)  BMI 31.86 kg/m2 BMI= Body mass index is 31.86 kg/(m^2).   No LMP recorded. Patient has had a hysterectomy.     GENERAL APPEARANCE: healthy, alert and no distress  ABDOMEN:  soft, nontender, no hepato-splenomegaly or hernias  PELVIC:  EGBUS:  Gaping perineum with tissue visible to introitus; determined to be posterior wall of vagina  VAGINA:  1+ cystocele; apex well supported  3+ rectocele with strain; UV angle well supported; no incontinence demonstrated  CERVIX:  absent  UTERUS:  absent  ADNEXAE:  absent    ASSESSMENT:      ICD-10-CM    1. Rectocele N81.6        PLAN:  I discussed with Precious the anatomic issue of redundancy of support between rectum and vagina; we discussed watchful waiting, use of vaginal estradiol or surgical repair, Posterior vaginal repair; in general, pessaries do not work well with this condition  Pt would like to consider repair later this summer  Dora Eagle MD  [facility]      Dora Eagle MD    "

## 2018-06-04 NOTE — MR AVS SNAPSHOT
"              After Visit Summary   2018    Precious Reyes    MRN: [id number]           Patient Information     Date Of Birth          1950        Visit Information        Provider Department      2018 4:00 PM Dora Eagle MD [facility]        Today's Diagnoses     Rectocele    -  1       Follow-ups after your visit        Who to contact     If you have questions or need follow up information about today's clinic visit or your schedule please contact [facility] directly at [phone].  Normal or non-critical lab and imaging results will be communicated to you by BenchPrephart, letter or phone within 4 business days after the clinic has received the results. If you do not hear from us within 7 days, please contact the clinic through ZEALERt or phone. If you have a critical or abnormal lab result, we will notify you by phone as soon as possible.  Submit refill requests through [email or web address] or call your pharmacy and they will forward the refill request to us. Please allow 3 business days for your refill to be completed.          Additional Information About Your Visit        MyChart Information     [email or web address] lets you send messages to your doctor, view your test results, renew your prescriptions, schedule appointments and more. To sign up, go to [email or web address] . Click on \"Log in\" on the left side of the screen, which will take you to the Welcome page. Then click on \"Sign up Now\" on the right side of the page.     You will be asked to enter the access code listed below, as well as some personal information. Please follow the directions to create your username and password.     Your access code is: [email or web address]  Expires: 2018 11:59 AM     Your access code will  in 90 days. If you need help or a new code, please call your [facility] or [phone].        Care EveryWhere ID     This is your Care EveryWhere ID. This could be used by other organizations to access your " "[place] medical records  [id number]        Your Vitals Were     Pulse Temperature Respirations Height BMI (Body Mass Index)       52 97.1  F (36.2  C) (Tympanic) 18 5' 2.5\" (1.588 m) 31.86 kg/m2        Blood Pressure from Last 3 Encounters:   06/04/18 149/79   05/02/18 128/80   10/10/17 130/84    Weight from Last 3 Encounters:   06/04/18 177 lb (80.3 kg)   05/02/18 182 lb (82.6 kg)   10/10/17 186 lb (84.4 kg)              We Performed the Following     *UA reflex to Microscopic     Nadine-Operative Worksheet GYN        Primary Care Provider Office Phone # Fax #    Chau Cox [phone] [address] [address]        Equal Access to Services     [facility]: Hadii tiffany leal hadasho Sorob, waaxda luqadaha, qaybta kaalmada adeegyada, raymond mondragon hayramon curry . So [facility] [phone].    ATENCIÓN: Si habla español, tiene a faoroq disposición servicios gratuitos de asistencia lingüística. Llame al [phone].    We comply with applicable federal civil rights laws and [place] laws. We do not discriminate on the basis of race, color, national origin, age, disability, sex, sexual orientation, or gender identity.            Thank you!     Thank you for choosing [facility]  for your care. Our goal is always to provide you with excellent care. Hearing back from our patients is one way we can continue to improve our services. Please take a few minutes to complete the written survey that you may receive in the mail after your visit with us. Thank you!             Your Updated Medication List - Protect others around you: Learn how to safely use, store and throw away your medicines at www.disposemymeds.org.          This list is accurate as of 6/4/18  4:29 PM.  Always use your most recent med list.                   Brand Name Dispense Instructions for use Diagnosis    aspirin 81 MG tablet     100    ONE DAILY        atenolol 50 MG tablet    TENORMIN    135 " tablet    Take 1.5 tablets (75 mg) by mouth daily    Essential hypertension       cetirizine 10 MG tablet    zyrTEC     Take 10 mg by mouth daily        ciprofloxacin 500 MG tablet    CIPRO    6 tablet    Take 1 tablet (500 mg) by mouth 2 times daily    Urinary tract infection without hematuria, site unspecified       estrogens (conjugated) 0.625 MG tablet    PREMARIN     Take 0.625 mg by mouth daily        hydrochlorothiazide 25 MG tablet    HYDRODIURIL    90 tablet    Take 1 tablet (25 mg) by mouth daily    Essential hypertension with goal blood pressure less than 140/90       ibuprofen 200 MG tablet    ADVIL/MOTRIN     TAKE 1 TO 2 TABLETS EVERY 4 TO 6 HOURS AS NEEDED WITH FOOD        nitroFURantoin (macrocrystal-monohydrate) 100 MG capsule    MACROBID    14 capsule    Take 1 capsule (100 mg) by mouth 2 times daily    Urinary tract infection without hematuria, site unspecified       order for DME     1    needs have cpap machine and mask, last one 10 years old, not fitting right    LOLY (obstructive sleep apnea)       tolterodine 4 MG 24 hr capsule    DETROL LA    90 capsule    Take 1 capsule (4 mg) by mouth daily    Urgency of urination, Urge incontinence       VITAMIN D PO      Take 1,000 Units by mouth daily

## 2018-06-04 NOTE — NURSING NOTE
"Initial /79 (BP Location: Right arm, Patient Position: Chair, Cuff Size: Adult Small)  Pulse 52  Temp 97.1  F (36.2  C) (Tympanic)  Resp 18  Ht 5' 2.5\" (1.588 m)  Wt 177 lb (80.3 kg)  BMI 31.86 kg/m2 Estimated body mass index is 31.86 kg/(m^2) as calculated from the following:    Height as of this encounter: 5' 2.5\" (1.588 m).    Weight as of this encounter: 177 lb (80.3 kg). .      "

## 2018-06-05 ENCOUNTER — TELEPHONE (OUTPATIENT)
Dept: OBGYN | Facility: CLINIC | Age: 68
End: 2018-06-05

## 2018-06-05 NOTE — TELEPHONE ENCOUNTER
Type of surgery:  Posterior vaginal repair       Reason for procedure tighten posterior vaginal wall over rectum       Location of surgery: [place] OR  Date and time of surgery: 07/17/18 @ 7:30 AM  Surgeon: NATASHA  Pre-Op Appt Date: pt will schedule  Post-Op Appt Date: pt will schedule   Packet sent out: Yes  Pre-cert/Authorization completed:  Not Applicable  Date: 6/5/18

## 2018-07-10 ENCOUNTER — TELEPHONE (OUTPATIENT)
Dept: FAMILY MEDICINE | Facility: CLINIC | Age: 68
End: 2018-07-10

## 2018-07-10 NOTE — TELEPHONE ENCOUNTER
Left detailed message for patient on her home voice mail as she requested  Advised she can come fasting to her pre op appt tomorrow in the case of need of fasting labs.  Dr. Cox will order appropriate labs at her appt tomorrow.  Patient should call the clinic back with any questions or concerns    Veronica BENITO Rn

## 2018-07-10 NOTE — TELEPHONE ENCOUNTER
Reason for Call:  Other pt question    Detailed comments: pt calling wondering if there is anything special she needs to be doing before her pre-op appt tomorrow. Blood draw, no eating or drinking etc. She said she will be outside and it's ok to leave her a message.    Phone Number Patient can be reached at: Home number on file [phone] (home)    Best Time: any    Can we leave a detailed message on this number? YES    Call taken on 7/10/2018 at 11:26 AM by Malissa Jorgensen

## 2018-07-11 ENCOUNTER — OFFICE VISIT (OUTPATIENT)
Dept: FAMILY MEDICINE | Facility: CLINIC | Age: 68
End: 2018-07-11
Payer: COMMERCIAL

## 2018-07-11 VITALS
WEIGHT: 176 LBS | BODY MASS INDEX: 31.18 KG/M2 | TEMPERATURE: 97.7 F | DIASTOLIC BLOOD PRESSURE: 68 MMHG | HEIGHT: 63 IN | SYSTOLIC BLOOD PRESSURE: 116 MMHG | HEART RATE: 64 BPM

## 2018-07-11 DIAGNOSIS — N81.6 RECTOCELE: ICD-10-CM

## 2018-07-11 DIAGNOSIS — Z01.818 PREOP GENERAL PHYSICAL EXAM: Primary | ICD-10-CM

## 2018-07-11 DIAGNOSIS — I10 ESSENTIAL HYPERTENSION: ICD-10-CM

## 2018-07-11 PROCEDURE — 99214 OFFICE O/P EST MOD 30 MIN: CPT | Performed by: FAMILY MEDICINE

## 2018-07-11 PROCEDURE — 93000 ELECTROCARDIOGRAM COMPLETE: CPT | Performed by: FAMILY MEDICINE

## 2018-07-11 NOTE — PROGRESS NOTES
[facility]  [address] [address]  [phone]  Dept: [phone]    PRE-OP EVALUATION:  Today's date: 2018    Precious Reyes (: 1950) presents for pre-operative evaluation assessment as requested by Dr. Eagle.  She requires evaluation and anesthesia risk assessment prior to undergoing surgery/procedure for treatment of  rectocele.    Proposed Surgery/ Procedure: COLPORRHAPHY POSTERIOR  Date of Surgery/ Procedure: 18  Time of Surgery/ Procedure: 7:30  Hospital/Surgical Facility: [facility]    Primary Physician: Chau Cox  Type of Anesthesia Anticipated: General    Patient has a Health Care Directive or Living Will:  YES   Has healthcare directive.    1. NO - Do you have a history of heart attack, stroke, stent, bypass or surgery on an artery in the head, neck, heart or legs?  2. NO - Do you ever have any pain or discomfort in your chest?  3. NO - Do you have a history of  Heart Failure?  4. YES - ARE YOUR TROUBLED BY SHORTNESS OF BREATH WHEN WALKING ON THE LEVEL, UP A SLIGHT HILL OR AT NIGHT? No major issues  5. NO - Do you currently have a cold, bronchitis or other respiratory infection?  6. NO - Do you have a cough, shortness of breath or wheezing?  7. NO - Do you sometimes get pains in the calves of your legs when you walk?  8. NO - Do you or anyone in your family have previous history of blood clots?  9. NO - Do you or does anyone in your family have a serious bleeding problem such as prolonged bleeding following surgeries or cuts?  10. YES - HAVE YOU EVER HAD PROBLEMS WITH ANEMIA OR BEEN TOLD TO TAKE IRON PILLS? When she was young took some iron tablets  11. NO - Have you had any abnormal blood loss such as black, tarry or bloody stools, or abnormal vaginal bleeding?  12. NO - Have you ever had a blood transfusion?  13. NO - Have you or any of your relatives ever had problems with anesthesia?  14. YES - DO YOU HAVE SLEEP APNEA, EXCESSIVE SNORING  OR DAYTIME DROWSINESS? Has known LOLY  15. NO - Do you have any prosthetic heart valves?  16. NO - Do you have prosthetic joints?  17. NO - Is there any chance that you may be pregnant?      HPI:     HPI related to upcoming procedure: treatment of rectocele      HYPERTENSION - Patient has longstanding history of HTN , currently denies any symptoms referable to elevated blood pressure. Specifically denies chest pain, palpitations, dyspnea, orthopnea, PND or peripheral edema. Blood pressure readings have been in normal range. Current medication regimen is as listed below. Patient denies any side effects of medication.                                                                                                                                                                                          .    MEDICAL HISTORY:     Patient Active Problem List    Diagnosis Date Noted     Rectocele 06/04/2018     Priority: Medium     Hypertriglyceridemia 10/13/2015     Priority: Medium     OA (osteoarthritis) of finger, unspecified laterality 10/05/2015     Priority: Medium     Advanced directives, counseling/discussion 06/06/2012     Priority: Medium     Discussed advance care planning with patient; information given to patient to review. 6/6/2012    Advanced Care Directive received, [facility]. 12/7/2012 Nevaeh Regalado RN           CARDIOVASCULAR SCREENING; LDL GOAL LESS THAN 130 10/31/2010     Priority: Medium     Disease of jaw 08/26/2007     Priority: Medium     Problem list name updated by automated process. Provider to review       Abnormal weight gain 08/26/2007     Priority: Medium     Disorder of bone and cartilage 06/21/2007     Priority: Medium     Problem list name updated by automated process. Provider to review       Actinic keratosis 08/09/2006     Priority: Medium     Dorsum left foot 4 mm.       RIGHT KNEE PAIN AND LUMP 08/09/2006     Priority: Medium     Fell 9/05 trying to catch  grandchild from going into the street.  Landed on knees and had large hematomas, left worse than right.  8/06 left knee swollen with bony lump on patella, no pain or difficulty moving or bending.       RECTAL PAIN 08/09/2006     Priority: Medium     Other malignant neoplasm of skin, site unspecified 05/03/2005     Priority: Medium     Non-melanoma skin cancer, Pt is checking to learn what  kind of cancer  Problem list name updated by automated process. Provider to review and confirm       Essential hypertension 05/03/2005     Priority: Medium     atenolol  Problem list name updated by automated process. Provider to review       Urinary incontinence 05/03/2005     Priority: Medium     detrol 7/04  Problem list name updated by automated process. Provider to review        Past Medical History:   Diagnosis Date     Cholecystitis, unspecified 9/05     Malignant melanoma (H)      Other malignant neoplasm of skin, site unspecified ??????1995    Non-melanoma skin cancer, Pt is checking to learn what  kind of cancer     Unspecified essential hypertension     atenolol     URINARY INCONTINENCE 5/3/2005    detrol 7/04     Urinary tract infection, site not specified 7/04    Recurrent UTI's     Past Surgical History:   Procedure Laterality Date     BIOPSY BREAST       COLONOSCOPY  age 49    repeat 10 years, done at outside hospital     HC EXCISION BREAST LESION, OPEN >=1      5 biopsies all neg     HC REMOVAL GALLBLADDER  9 05    Cholecystectomy     HYSTERECTOMY, PAP NO LONGER INDICATED  07/2004    TVH-BSO, Lynx Pubovaginal sling     Current Outpatient Prescriptions   Medication Sig Dispense Refill     ASPIRIN 81 MG OR TABS ONE DAILY 100 3     atenolol (TENORMIN) 50 MG tablet Take 1.5 tablets (75 mg) by mouth daily 135 tablet 3     cetirizine (ZYRTEC) 10 MG tablet Take 10 mg by mouth daily       hydrochlorothiazide (HYDRODIURIL) 25 MG tablet Take 1 tablet (25 mg) by mouth daily 90 tablet 3     IBUPROFEN 200 MG OR TABS TAKE 1 TO 2  "TABLETS EVERY 4 TO 6 HOURS AS NEEDED WITH FOOD  0     tolterodine (DETROL LA) 4 MG 24 hr capsule Take 1 capsule (4 mg) by mouth daily 90 capsule 3     VITAMIN D PO Take 1,000 Units by mouth daily        ORDER FOR DME needs have cpap machine and mask, last one 10 years old, not fitting right 1 0     OTC products: None, except as noted above    Allergies   Allergen Reactions     Adhesive Tape Rash     Pt states it is Latex, not tape.     Latex Rash     Sulfa Drugs Rash      Latex Allergy: YES: Precautions to take: patient had local reaction she states to latex and avoids certain materials.  No shortness of breath    Social History   Substance Use Topics     Smoking status: Never Smoker     Smokeless tobacco: Never Used     Alcohol use Yes      Comment: 0-2 drinks per week     History   Drug Use No       REVIEW OF SYSTEMS:   Review Of Systems   Constitutional: negative  Skin: negative  Eyes: negative  Ears/Nose/Throat: negative  Respiratory: No shortness of breath, dyspnea on exertion, cough, or hemoptysis  Cardiovascular: negative  Gastrointestinal: negative  Genitourinary: has rectocele  Musculoskeletal: negative  Neurologic: negative  Psychiatric: negative  Hematologic/Lymphatic/Immunologic: negative  Endocrine: negative      EXAM:   /68 (Cuff Size: Adult Large)  Pulse 64  Temp 97.7  F (36.5  C) (Tympanic)  Ht 5' 2.5\" (1.588 m)  Wt 176 lb (79.8 kg)  BMI 31.68 kg/m2    GENERAL APPEARANCE: healthy, alert and no distress     EYES: EOMI, PERRL     HENT: ear canals and TM's normal and nose and mouth without ulcers or lesions     NECK: no adenopathy, no asymmetry, masses, or scars and thyroid normal to palpation     RESP: lungs clear to auscultation - no rales, rhonchi or wheezes     CV: regular rates and rhythm, normal S1 S2, no S3 or S4 and no murmur, click or rub     ABDOMEN:  soft, nontender, no HSM or masses and bowel sounds normal     MS: extremities normal- no gross deformities noted, no evidence of " inflammation in joints, FROM in all extremities.     SKIN: no suspicious lesions or rashes     NEURO: Normal strength and tone, sensory exam grossly normal, mentation intact and speech normal     PSYCH: mentation appears normal. and affect normal/bright     LYMPHATICS: No cervical adenopathy    DIAGNOSTICS:   EKG: sinus bradycardia, left axis deviation, no significant change from prior ekg, no signs of ischemia, no LVH by voltage criteria, unchanged from previous tracings    Recent Labs   Lab Test  10/10/17   1028  10/12/16   1102   09/30/14   0951   09/26/10   1720   HGB   --    --    --   14.8   --   15.0   PLT   --    --    --   232   --   280   NA  143  139   < >  142   < >  140   POTASSIUM  3.9  3.7   < >  3.8   < >  4.1   CR  0.92  0.91   < >  0.80   < >  0.68    < > = values in this interval not displayed.        IMPRESSION:   Reason for surgery/procedure: rectocele    The proposed surgical procedure is considered INTERMEDIATE risk.    REVISED CARDIAC RISK INDEX  The patient has the following serious cardiovascular risks for perioperative complications such as (MI, PE, VFib and 3  AV Block):    INTERPRETATION: 0 risks: Class I (very low risk - 0.4% complication rate)    The patient has the following additional risks for perioperative complications:        ICD-10-CM    1. Preop general physical exam Z01.818 Basic metabolic panel     EKG 12-lead complete w/read - Clinics   2. Rectocele N81.6    3. Essential hypertension I10 Basic metabolic panel     EKG 12-lead complete w/read - Clinics       RECOMMENDATIONS:         --Patient is to take all scheduled medications on the day of surgery EXCEPT for modifications listed below.    APPROVAL GIVEN to proceed with proposed procedure, without further diagnostic evaluation       Signed Electronically by: Chau Cox MD    Copy of this evaluation report is provided to requesting physician.    [place] Preop Guidelines    Revised Cardiac Risk Index

## 2018-07-11 NOTE — MR AVS SNAPSHOT
After Visit Summary   7/11/2018    Precious Reyes    MRN: [id number]           Patient Information     Date Of Birth          1950        Visit Information        Provider Department      7/11/2018 9:40 AM Chau Cox MD [facility]        Today's Diagnoses     Preop general physical exam    -  1    Rectocele        Essential hypertension          Care Instructions    Stay healthy and go to lab.    Stop aspirin or other nsaids 7-10 days prior to surgery.        Thank you for choosing [facility].  You may be receiving a survey in the mail from Tony Chambers regarding your visit today.  Please take a few minutes to complete and return the survey to let us know how we are doing.      If you have questions or concerns, please contact us via Studio Moderna or you can contact your care team at [phone].    Our Clinic hours are:  Monday 6:40 am  to 7:00 pm  Tuesday -Friday 6:40 am to 5:00 pm    The [place] outpatient lab hours are:  Monday - Friday 6:10 am to 4:45 pm  Saturdays 7:00 am to 11:00 am  Appointments are required, call [phone]    If you have clinical questions after hours or would like to schedule an appointment,  call the clinic at [phone].    Before Your Surgery      Call your surgeon if there is any change in your health. This includes signs of a cold or flu (such as a sore throat, runny nose, cough, rash or fever).    Do not smoke, drink alcohol or take over the counter medicine (unless your surgeon or primary care doctor tells you to) for the 24 hours before and after surgery.    If you take prescribed drugs: Follow your doctor s orders about which medicines to take and which to stop until after surgery.    Eating and drinking prior to surgery: follow the instructions from your surgeon    Take a shower or bath the night before surgery. Use the soap your surgeon gave you to gently clean your skin. If you do not have soap from your surgeon, use your regular  "soap. Do not shave or scrub the surgery site.  Wear clean pajamas and have clean sheets on your bed.           Follow-ups after your visit        Your next 10 appointments already scheduled     Jul 17, 2018   Procedure with Dora Eagle MD   [facility] (--)    [address] [address] [phone]           The medical center is located at [address] (between [address] in [place], [place] north of [place]).              Who to contact     If you have questions or need follow up information about today's clinic visit or your schedule please contact [facility] directly at [phone].  Normal or non-critical lab and imaging results will be communicated to you by MyChart, letter or phone within 4 business days after the clinic has received the results. If you do not hear from us within 7 days, please contact the clinic through MyChart or phone. If you have a critical or abnormal lab result, we will notify you by phone as soon as possible.  Submit refill requests through [email or web address] or call your pharmacy and they will forward the refill request to us. Please allow 3 business days for your refill to be completed.          Additional Information About Your Visit        Care EveryWhere ID     This is your Care EveryWhere ID. This could be used by other organizations to access your [place] medical records  [id number]        Your Vitals Were     Pulse Temperature Height BMI (Body Mass Index)          64 97.7  F (36.5  C) (Tympanic) 5' 2.5\" (1.588 m) 31.68 kg/m2         Blood Pressure from Last 3 Encounters:   07/11/18 116/68   06/04/18 149/79   05/02/18 128/80    Weight from Last 3 Encounters:   07/11/18 176 lb (79.8 kg)   06/04/18 177 lb (80.3 kg)   05/02/18 182 lb (82.6 kg)              We Performed the Following     Basic metabolic panel     EKG 12-lead complete w/read - Clinics          Today's Medication Changes          These " changes are accurate as of 7/11/18 10:14 AM.  If you have any questions, ask your nurse or doctor.               Stop taking these medicines if you haven't already. Please contact your care team if you have questions.     estrogens (conjugated) 0.625 MG tablet   Commonly known as:  PREMARIN                    Primary Care Provider Office Phone # Fax #    Chau Cox [phone] [address] [address]        Equal Access to Services     ISAAC LIM : Hadii aad ku hadasho Soomaali, waaxda luqadaha, qaybta kaalmada adeegyada, waxay idiin hayaan adeeg andrewarash la'ramon . So [facility] [phone].    ATENCIÓN: Si habla español, tiene a farooq disposición servicios gratuitos de asistencia lingüística. [facility] [phone].    We comply with applicable federal civil rights laws and [place] laws. We do not discriminate on the basis of race, color, national origin, age, disability, sex, sexual orientation, or gender identity.            Thank you!     Thank you for choosing [facility]  for your care. Our goal is always to provide you with excellent care. Hearing back from our patients is one way we can continue to improve our services. Please take a few minutes to complete the written survey that you may receive in the mail after your visit with us. Thank you!             Your Updated Medication List - Protect others around you: Learn how to safely use, store and throw away your medicines at www.disposemymeds.org.          This list is accurate as of 7/11/18 10:14 AM.  Always use your most recent med list.                   Brand Name Dispense Instructions for use Diagnosis    aspirin 81 MG tablet     100    ONE DAILY        atenolol 50 MG tablet    TENORMIN    135 tablet    Take 1.5 tablets (75 mg) by mouth daily    Essential hypertension       cetirizine 10 MG tablet    zyrTEC     Take 10 mg by mouth daily        hydrochlorothiazide 25 MG tablet    HYDRODIURIL    90  tablet    Take 1 tablet (25 mg) by mouth daily    Essential hypertension with goal blood pressure less than 140/90       ibuprofen 200 MG tablet    ADVIL/MOTRIN     TAKE 1 TO 2 TABLETS EVERY 4 TO 6 HOURS AS NEEDED WITH FOOD        order for DME     1    needs have cpap machine and mask, last one 10 years old, not fitting right    LOLY (obstructive sleep apnea)       tolterodine 4 MG 24 hr capsule    DETROL LA    90 capsule    Take 1 capsule (4 mg) by mouth daily    Urgency of urination, Urge incontinence       VITAMIN D PO      Take 1,000 Units by mouth daily

## 2018-07-11 NOTE — PATIENT INSTRUCTIONS
Stay healthy and go to lab.    Stop aspirin or other nsaids 7-10 days prior to surgery.        Thank you for choosing [facility].  You may be receiving a survey in the mail from [facility] regarding your visit today.  Please take a few minutes to complete and return the survey to let us know how we are doing.      If you have questions or concerns, please contact us via [employer] or you can contact your care team at [phone].    Our Clinic hours are:  Monday 6:40 am  to 7:00 pm  Tuesday -Friday 6:40 am to 5:00 pm    The [place] outpatient lab hours are:  Monday - Friday 6:10 am to 4:45 pm  Saturdays 7:00 am to 11:00 am  Appointments are required, call [phone]    If you have clinical questions after hours or would like to schedule an appointment,  call the clinic at [phone].    Before Your Surgery      Call your surgeon if there is any change in your health. This includes signs of a cold or flu (such as a sore throat, runny nose, cough, rash or fever).    Do not smoke, drink alcohol or take over the counter medicine (unless your surgeon or primary care doctor tells you to) for the 24 hours before and after surgery.    If you take prescribed drugs: Follow your doctor s orders about which medicines to take and which to stop until after surgery.    Eating and drinking prior to surgery: follow the instructions from your surgeon    Take a shower or bath the night before surgery. Use the soap your surgeon gave you to gently clean your skin. If you do not have soap from your surgeon, use your regular soap. Do not shave or scrub the surgery site.  Wear clean pajamas and have clean sheets on your bed.

## 2018-07-14 ENCOUNTER — ANESTHESIA EVENT (OUTPATIENT)
Dept: SURGERY | Facility: CLINIC | Age: 68
End: 2018-07-14
Payer: MEDICARE

## 2018-07-17 ENCOUNTER — ANESTHESIA (OUTPATIENT)
Dept: SURGERY | Facility: CLINIC | Age: 68
End: 2018-07-17
Payer: MEDICARE

## 2018-07-17 ENCOUNTER — SURGERY (OUTPATIENT)
Age: 68
End: 2018-07-17

## 2018-07-17 ENCOUNTER — HOSPITAL ENCOUNTER (OUTPATIENT)
Facility: CLINIC | Age: 68
Discharge: HOME | End: 2018-07-17
Attending: OBSTETRICS & GYNECOLOGY | Admitting: OBSTETRICS & GYNECOLOGY
Payer: MEDICARE

## 2018-07-17 VITALS
RESPIRATION RATE: 19 BRPM | OXYGEN SATURATION: 99 % | SYSTOLIC BLOOD PRESSURE: 155 MMHG | WEIGHT: 175.93 LBS | BODY MASS INDEX: 31.17 KG/M2 | DIASTOLIC BLOOD PRESSURE: 78 MMHG | HEIGHT: 63 IN | TEMPERATURE: 96 F

## 2018-07-17 DIAGNOSIS — N81.6 RECTOCELE: Primary | ICD-10-CM

## 2018-07-17 LAB
ANION GAP SERPL CALCULATED.3IONS-SCNC: 6 MMOL/L (ref 3–14)
BUN SERPL-MCNC: 10 MG/DL (ref 7–30)
CALCIUM SERPL-MCNC: 8.7 MG/DL (ref 8.5–10.1)
CHLORIDE SERPL-SCNC: 111 MMOL/L (ref 94–109)
CO2 SERPL-SCNC: 25 MMOL/L (ref 20–32)
CREAT SERPL-MCNC: 0.79 MG/DL (ref 0.52–1.04)
GFR SERPL CREATININE-BSD FRML MDRD: 72 ML/MIN/1.7M2
GLUCOSE SERPL-MCNC: 107 MG/DL (ref 70–99)
POTASSIUM SERPL-SCNC: 3.5 MMOL/L (ref 3.4–5.3)
SODIUM SERPL-SCNC: 142 MMOL/L (ref 133–144)

## 2018-07-17 PROCEDURE — 25000128 H RX IP 250 OP 636: Performed by: OBSTETRICS & GYNECOLOGY

## 2018-07-17 PROCEDURE — 40000306 ZZH STATISTIC PRE PROC ASSESS II: Performed by: OBSTETRICS & GYNECOLOGY

## 2018-07-17 PROCEDURE — 71000027 ZZH RECOVERY PHASE 2 EACH 15 MINS: Performed by: OBSTETRICS & GYNECOLOGY

## 2018-07-17 PROCEDURE — 57250 REPAIR RECTUM & VAGINA: CPT | Performed by: OBSTETRICS & GYNECOLOGY

## 2018-07-17 PROCEDURE — 80048 BASIC METABOLIC PNL TOTAL CA: CPT | Performed by: OBSTETRICS & GYNECOLOGY

## 2018-07-17 PROCEDURE — 25000128 H RX IP 250 OP 636: Performed by: NURSE ANESTHETIST, CERTIFIED REGISTERED

## 2018-07-17 PROCEDURE — 37000009 ZZH ANESTHESIA TECHNICAL FEE, EACH ADDTL 15 MIN: Performed by: OBSTETRICS & GYNECOLOGY

## 2018-07-17 PROCEDURE — A9270 NON-COVERED ITEM OR SERVICE: HCPCS | Mod: GY | Performed by: OBSTETRICS & GYNECOLOGY

## 2018-07-17 PROCEDURE — 25000125 ZZHC RX 250: Performed by: NURSE ANESTHETIST, CERTIFIED REGISTERED

## 2018-07-17 PROCEDURE — 25000125 ZZHC RX 250: Performed by: OBSTETRICS & GYNECOLOGY

## 2018-07-17 PROCEDURE — 37000008 ZZH ANESTHESIA TECHNICAL FEE, 1ST 30 MIN: Performed by: OBSTETRICS & GYNECOLOGY

## 2018-07-17 PROCEDURE — 25000132 ZZH RX MED GY IP 250 OP 250 PS 637: Mod: GY | Performed by: OBSTETRICS & GYNECOLOGY

## 2018-07-17 PROCEDURE — 36415 COLL VENOUS BLD VENIPUNCTURE: CPT | Performed by: OBSTETRICS & GYNECOLOGY

## 2018-07-17 PROCEDURE — 36000058 ZZH SURGERY LEVEL 3 EA 15 ADDTL MIN: Performed by: OBSTETRICS & GYNECOLOGY

## 2018-07-17 PROCEDURE — 36000056 ZZH SURGERY LEVEL 3 1ST 30 MIN: Performed by: OBSTETRICS & GYNECOLOGY

## 2018-07-17 PROCEDURE — 27210794 ZZH OR GENERAL SUPPLY STERILE: Performed by: OBSTETRICS & GYNECOLOGY

## 2018-07-17 RX ORDER — ALBUTEROL SULFATE 0.83 MG/ML
2.5 SOLUTION RESPIRATORY (INHALATION) EVERY 4 HOURS PRN
Status: DISCONTINUED | OUTPATIENT
Start: 2018-07-17 | End: 2018-07-17 | Stop reason: HOSPADM

## 2018-07-17 RX ORDER — CEFAZOLIN SODIUM 2 G/100ML
2 INJECTION, SOLUTION INTRAVENOUS
Status: COMPLETED | OUTPATIENT
Start: 2018-07-17 | End: 2018-07-17

## 2018-07-17 RX ORDER — OXYCODONE HYDROCHLORIDE 5 MG/1
5 TABLET ORAL EVERY 4 HOURS PRN
Qty: 20 TABLET | Refills: 0 | Status: SHIPPED | OUTPATIENT
Start: 2018-07-17 | End: 2018-09-25

## 2018-07-17 RX ORDER — HYDROXYZINE HYDROCHLORIDE 10 MG/1
10 TABLET, FILM COATED ORAL
Status: DISCONTINUED | OUTPATIENT
Start: 2018-07-17 | End: 2018-07-17 | Stop reason: HOSPADM

## 2018-07-17 RX ORDER — MEPERIDINE HYDROCHLORIDE 25 MG/ML
12.5 INJECTION INTRAMUSCULAR; INTRAVENOUS; SUBCUTANEOUS
Status: DISCONTINUED | OUTPATIENT
Start: 2018-07-17 | End: 2018-07-17 | Stop reason: HOSPADM

## 2018-07-17 RX ORDER — DEXAMETHASONE SODIUM PHOSPHATE 4 MG/ML
INJECTION, SOLUTION INTRA-ARTICULAR; INTRALESIONAL; INTRAMUSCULAR; INTRAVENOUS; SOFT TISSUE PRN
Status: DISCONTINUED | OUTPATIENT
Start: 2018-07-17 | End: 2018-07-17

## 2018-07-17 RX ORDER — ONDANSETRON 4 MG/1
4 TABLET, ORALLY DISINTEGRATING ORAL EVERY 30 MIN PRN
Status: DISCONTINUED | OUTPATIENT
Start: 2018-07-17 | End: 2018-07-17 | Stop reason: HOSPADM

## 2018-07-17 RX ORDER — CEFAZOLIN SODIUM 1 G/50ML
1 INJECTION, SOLUTION INTRAVENOUS SEE ADMIN INSTRUCTIONS
Status: DISCONTINUED | OUTPATIENT
Start: 2018-07-17 | End: 2018-07-17 | Stop reason: HOSPADM

## 2018-07-17 RX ORDER — OXYCODONE HYDROCHLORIDE 5 MG/1
5 TABLET ORAL
Status: DISCONTINUED | OUTPATIENT
Start: 2018-07-17 | End: 2018-07-17 | Stop reason: HOSPADM

## 2018-07-17 RX ORDER — SCOLOPAMINE TRANSDERMAL SYSTEM 1 MG/1
1 PATCH, EXTENDED RELEASE TRANSDERMAL
Status: DISCONTINUED | OUTPATIENT
Start: 2018-07-17 | End: 2018-07-17 | Stop reason: HOSPADM

## 2018-07-17 RX ORDER — FENTANYL CITRATE 50 UG/ML
25-50 INJECTION, SOLUTION INTRAMUSCULAR; INTRAVENOUS
Status: DISCONTINUED | OUTPATIENT
Start: 2018-07-17 | End: 2018-07-17 | Stop reason: HOSPADM

## 2018-07-17 RX ORDER — FENTANYL CITRATE 50 UG/ML
INJECTION, SOLUTION INTRAMUSCULAR; INTRAVENOUS PRN
Status: DISCONTINUED | OUTPATIENT
Start: 2018-07-17 | End: 2018-07-17

## 2018-07-17 RX ORDER — IBUPROFEN 600 MG/1
600 TABLET, FILM COATED ORAL EVERY 6 HOURS PRN
Qty: 30 TABLET | Refills: 1 | Status: SHIPPED | OUTPATIENT
Start: 2018-07-17 | End: 2018-09-25

## 2018-07-17 RX ORDER — OXYCODONE HCL 10 MG/1
10 TABLET, FILM COATED, EXTENDED RELEASE ORAL ONCE
Status: COMPLETED | OUTPATIENT
Start: 2018-07-17 | End: 2018-07-17

## 2018-07-17 RX ORDER — BUPIVACAINE HYDROCHLORIDE AND EPINEPHRINE 2.5; 5 MG/ML; UG/ML
INJECTION, SOLUTION INFILTRATION; PERINEURAL PRN
Status: DISCONTINUED | OUTPATIENT
Start: 2018-07-17 | End: 2018-07-17 | Stop reason: HOSPADM

## 2018-07-17 RX ORDER — KETOROLAC TROMETHAMINE 30 MG/ML
INJECTION, SOLUTION INTRAMUSCULAR; INTRAVENOUS PRN
Status: DISCONTINUED | OUTPATIENT
Start: 2018-07-17 | End: 2018-07-17

## 2018-07-17 RX ORDER — AMOXICILLIN 250 MG
1-2 CAPSULE ORAL 2 TIMES DAILY
Qty: 60 TABLET | Refills: 1 | Status: SHIPPED | OUTPATIENT
Start: 2018-07-17 | End: 2018-09-25

## 2018-07-17 RX ORDER — HYDROMORPHONE HYDROCHLORIDE 1 MG/ML
.3-.5 INJECTION, SOLUTION INTRAMUSCULAR; INTRAVENOUS; SUBCUTANEOUS EVERY 10 MIN PRN
Status: DISCONTINUED | OUTPATIENT
Start: 2018-07-17 | End: 2018-07-17 | Stop reason: HOSPADM

## 2018-07-17 RX ORDER — NALOXONE HYDROCHLORIDE 0.4 MG/ML
.1-.4 INJECTION, SOLUTION INTRAMUSCULAR; INTRAVENOUS; SUBCUTANEOUS
Status: DISCONTINUED | OUTPATIENT
Start: 2018-07-17 | End: 2018-07-17 | Stop reason: HOSPADM

## 2018-07-17 RX ORDER — LIDOCAINE 40 MG/G
CREAM TOPICAL
Status: DISCONTINUED | OUTPATIENT
Start: 2018-07-17 | End: 2018-07-17 | Stop reason: HOSPADM

## 2018-07-17 RX ORDER — IBUPROFEN 600 MG/1
600 TABLET, FILM COATED ORAL
Status: DISCONTINUED | OUTPATIENT
Start: 2018-07-17 | End: 2018-07-17 | Stop reason: HOSPADM

## 2018-07-17 RX ORDER — ONDANSETRON 4 MG/1
4 TABLET, ORALLY DISINTEGRATING ORAL
Status: DISCONTINUED | OUTPATIENT
Start: 2018-07-17 | End: 2018-07-17 | Stop reason: HOSPADM

## 2018-07-17 RX ORDER — ONDANSETRON 2 MG/ML
4 INJECTION INTRAMUSCULAR; INTRAVENOUS EVERY 30 MIN PRN
Status: DISCONTINUED | OUTPATIENT
Start: 2018-07-17 | End: 2018-07-17 | Stop reason: HOSPADM

## 2018-07-17 RX ORDER — SODIUM CHLORIDE, SODIUM LACTATE, POTASSIUM CHLORIDE, CALCIUM CHLORIDE 600; 310; 30; 20 MG/100ML; MG/100ML; MG/100ML; MG/100ML
INJECTION, SOLUTION INTRAVENOUS CONTINUOUS
Status: DISCONTINUED | OUTPATIENT
Start: 2018-07-17 | End: 2018-07-17 | Stop reason: HOSPADM

## 2018-07-17 RX ORDER — ONDANSETRON 2 MG/ML
INJECTION INTRAMUSCULAR; INTRAVENOUS PRN
Status: DISCONTINUED | OUTPATIENT
Start: 2018-07-17 | End: 2018-07-17

## 2018-07-17 RX ADMIN — FENTANYL CITRATE 50 MCG: 50 INJECTION, SOLUTION INTRAMUSCULAR; INTRAVENOUS at 07:50

## 2018-07-17 RX ADMIN — BUPIVACAINE HYDROCHLORIDE AND EPINEPHRINE BITARTRATE 9 ML: 2.5; .005 INJECTION, SOLUTION INFILTRATION; PERINEURAL at 08:01

## 2018-07-17 RX ADMIN — SODIUM CHLORIDE, POTASSIUM CHLORIDE, SODIUM LACTATE AND CALCIUM CHLORIDE: 600; 310; 30; 20 INJECTION, SOLUTION INTRAVENOUS at 08:32

## 2018-07-17 RX ADMIN — OXYCODONE HYDROCHLORIDE 10 MG: 10 TABLET, FILM COATED, EXTENDED RELEASE ORAL at 06:42

## 2018-07-17 RX ADMIN — MIDAZOLAM 2 MG: 1 INJECTION INTRAMUSCULAR; INTRAVENOUS at 07:35

## 2018-07-17 RX ADMIN — PROCHLORPERAZINE EDISYLATE 5 MG: 5 INJECTION INTRAMUSCULAR; INTRAVENOUS at 09:43

## 2018-07-17 RX ADMIN — LIDOCAINE HYDROCHLORIDE 1 ML: 10 INJECTION, SOLUTION EPIDURAL; INFILTRATION; INTRACAUDAL; PERINEURAL at 06:42

## 2018-07-17 RX ADMIN — VASOPRESSIN 9 ML: 20 INJECTION INTRAVENOUS at 08:03

## 2018-07-17 RX ADMIN — KETOROLAC TROMETHAMINE 30 MG: 30 INJECTION, SOLUTION INTRAMUSCULAR at 08:05

## 2018-07-17 RX ADMIN — FENTANYL CITRATE 100 MCG: 50 INJECTION, SOLUTION INTRAMUSCULAR; INTRAVENOUS at 07:40

## 2018-07-17 RX ADMIN — CEFAZOLIN SODIUM 2 G: 2 INJECTION, SOLUTION INTRAVENOUS at 07:35

## 2018-07-17 RX ADMIN — FENTANYL CITRATE 100 MCG: 50 INJECTION, SOLUTION INTRAMUSCULAR; INTRAVENOUS at 07:35

## 2018-07-17 RX ADMIN — ONDANSETRON 4 MG: 2 INJECTION INTRAMUSCULAR; INTRAVENOUS at 09:16

## 2018-07-17 RX ADMIN — SCOPOLAMINE 1 PATCH: 1 PATCH, EXTENDED RELEASE TRANSDERMAL at 11:17

## 2018-07-17 RX ADMIN — DEXAMETHASONE SODIUM PHOSPHATE 4 MG: 4 INJECTION, SOLUTION INTRA-ARTICULAR; INTRALESIONAL; INTRAMUSCULAR; INTRAVENOUS; SOFT TISSUE at 08:05

## 2018-07-17 RX ADMIN — SODIUM CHLORIDE, POTASSIUM CHLORIDE, SODIUM LACTATE AND CALCIUM CHLORIDE: 600; 310; 30; 20 INJECTION, SOLUTION INTRAVENOUS at 06:42

## 2018-07-17 RX ADMIN — ONDANSETRON 4 MG: 2 INJECTION INTRAMUSCULAR; INTRAVENOUS at 08:05

## 2018-07-17 NOTE — ANESTHESIA POSTPROCEDURE EVALUATION
Patient: Precious Reyes    Procedure(s):  Posterior Vaginal Repair - Wound Class: II-Clean Contaminated    Diagnosis:tighten posterior vaginal wall over rectum  Diagnosis Additional Information: No value filed.    Anesthesia Type:  MAC    Note:  Anesthesia Post Evaluation    Patient location during evaluation: Bedside  Patient participation: Able to fully participate in evaluation  Level of consciousness: awake and alert  Pain management: adequate  Airway patency: patent  Cardiovascular status: acceptable  Respiratory status: acceptable  Hydration status: acceptable  PONV: none     Anesthetic complications: None          Last vitals:  Vitals:    07/17/18 0830 07/17/18 0832 07/17/18 0838   BP: (!) 86/50  106/51   Resp: 8  10   Temp:      SpO2:  95%          Electronically Signed By: LIANA Hutchinson CRNA  July 17, 2018  8:40 AM

## 2018-07-17 NOTE — OP NOTE
Procedure Date: 07/17/2018      DATE OF SERVICE: 07/17/2018.      PREOPERATIVE DIAGNOSIS:  Rectocele.      POSTOPERATIVE DIAGNOSIS:  Rectocele.      PROCEDURE:  Posterior vaginal repair.      SURGEON:  Dora Eagle MD      ANESTHESIA:  Local with MAC.      DESCRIPTION OF PROCEDURE:  After assuring informed consent, the patient was taken to the operating suite, where MAC anesthetic was initiated.  The patient was placed in the dorsal lithotomy position.  The vagina and perineum were sterilely prepped and draped.  A timeout safety assessment was performed.  A Shaffer catheter was placed for bladder decompression. The extent of the rectocele was visionally and digitally assessed.  The introitus was then infiltrated with a 50% mixture of 0.25% Marcaine solution with epinephrine and diluted Pitressin and then 2 Allis clamps placed at the 4:00 and 7:00 positions. A dain-shaped piece of mucosa was then excised across the perineum and onto the perineal body and excised completely.  The posterior vaginal wall was then infiltrated with the same Pitressin, Marcaine solution as the solution up towards the apex of the rectocele.  The vaginal mucosa was then undermined with scissors and incised longitudinally wrapping the mucosal edge with Allis clamps along the way. The rectovaginal plane was then developed with sharp and blunt dissection to the right and left.  The rectovaginal fascia was then plicated with multiple interrupted figure-of-eight sutures, reducing the rectocele and a small apical enterocele.  The excess vaginal mucosa was then trimmed and the vaginal walls reapproximated in a running locking fashion with a Vicryl suture.  The perineal body was similarly reapproximated in a subcuticular fashion.  The incision line was then inspected for hemostasis and once satisfactory, the Shaffer catheter was removed.  The patient was then awakened and returned to Same Day Surgery in stable condition.      ESTIMATED BLOOD LOSS:   Was 10 mL.      SPECIMENS TO LAB:  None.         MENG LO MD             D: 2018   T: 2018   MT: SABINO      Name:     CHIARA ESPINOZA   MRN:      [id number]        Account:        [id number]   :      1950           Procedure Date: 2018      Document: [id number]

## 2018-07-17 NOTE — DISCHARGE INSTRUCTIONS
Same Day Surgery Discharge Instructions  Special Precautions After Surgery - Adult    1. It is not unusual to feel lightheaded or faint, up to 24 hours after surgery or while taking pain medication.  If you have these symptoms; sit for a few minutes before standing and have someone assist you when getting up.  2. You should rest and relax for the next 24 hours and must have someone stay with you for at least 24 hours after your discharge.  3. DO NOT DRIVE any vehicle or operate mechanical equipment for 24 hours following the end of your surgery.  DO NOT DRIVE while taking narcotic pain medications that have been prescribed by your physician.  If you had a limb operated on, you must be able to use it fully to drive.  4. DO NOT drink alcoholic beverages for 24 hours following surgery or while taking prescription pain medication.  5. Drink clear liquids (apple juice, ginger ale, broth, 7-Up, etc.).  Progress to your regular diet as you feel able.  6. Any questions call your physician and do not make important decisions for 24 hours.    ACTIVITY  ? Rest today.  No diet restrictions.  ? Resume activity as tolerated.  ? No lifting more than 10 pounds for 6 weeks.  ? Restrictions: Pelvic rest for 6 weeks (mo intercourse,tampons,douche)     INCISIONAL CARE  ? May shower after 24 hours.  ? Be alert for signs of infection:  redness, swelling, heat, drainage of pus, and/or elevated temperature.  Contact your doctor if these occur.        Call for an appointment to return to the clinic   2 weeks.    Medications:  ? Oxycodone:  Next dose: as needed every 4 hours.  ? Senna 1-2 times a day.  ? Follow the instructions on the bottle.     Additional discharge instructions:  As written by MD.  __________________________________________________________________________________________________________________________________  IMPORTANT NUMBERS:    [facility] Main Number:  [phone], [phone]  Pharmacy:   [phone]  Same Day Surgery:  [phone], Monday - Friday until 8:30 p.m.  Urgent Care:  [phone]  Emergency Room:  [phone]      [facility]:  [phone]                                                                             [facility]:  [phone] option 1  [facility]:  [phone]     OB Clinic:  [phone]   [facility]:  [phone]   Home Medical Equipment: [phone]  [facility]:  [phone]

## 2018-07-17 NOTE — IP AVS SNAPSHOT
[facility] PreOP/Phase II    [address] [address]    Phone:  [phone]    Fax:  [phone]                                       After Visit Summary   7/17/2018    Precious Reyes    MRN: [id number]           After Visit Summary Signature Page     I have received my discharge instructions, and my questions have been answered. I have discussed any challenges I see with this plan with the nurse or doctor.    ..........................................................................................................................................  Patient/Patient Representative Signature      ..........................................................................................................................................  Patient Representative Print Name and Relationship to Patient    ..................................................               ................................................  Date                                            Time    ..........................................................................................................................................  Reviewed by Signature/Title    ...................................................              ..............................................  Date                                                            Time

## 2018-07-17 NOTE — BRIEF OP NOTE
[facility] Gynecology Brief Operative Note    Pre-operative diagnosis: Rectocele repair   Post-operative diagnosis: same   Procedure: Posterior vaginal repair   Surgeon: Dora Eagle MD   Assistant(s): None   Anesthesia: MAC (monitored anesthesia care)   Estimated blood loss: less than 50ml   Total IV fluids: (See anesthesia record)   Blood transfusion: No transfusion was given during surgery   Total urine output: (See anesthesia record)   Drains: None   Specimens: None   Findings:    Complications: None   Condition: Stable   Comments: See dictated operative report for full details

## 2018-07-17 NOTE — INTERVAL H&P NOTE
H & P without interval change; informed consent reviewed and signed  Dora Eagle MD  Former[facility]

## 2018-07-17 NOTE — ANESTHESIA PREPROCEDURE EVALUATION
Anesthesia Evaluation     . Pt has had prior anesthetic. Type: General and MAC    No history of anesthetic complications          ROS/MED HX    ENT/Pulmonary:       Neurologic:       Cardiovascular:     (+) Dyslipidemia, hypertension----. : . . . :. .       METS/Exercise Tolerance:     Hematologic:         Musculoskeletal:   (+) arthritis, , , -       GI/Hepatic:         Renal/Genitourinary:         Endo:         Psychiatric:         Infectious Disease:   (+) Other Infectious Disease       Malignancy:   (+) Malignancy           Other:                     Physical Exam  Normal systems: cardiovascular, pulmonary and dental    Airway   Mallampati: I  TM distance: >3 FB  Neck ROM: full    Dental     Cardiovascular   Rhythm and rate: regular and normal      Pulmonary    breath sounds clear to auscultation                    Anesthesia Plan      History & Physical Review  History and physical reviewed and following examination; no interval change.    ASA Status:  3 .    NPO Status:  > 6 hours    Plan for MAC Reason for MAC:  Deep or markedly invasive procedure (G8)  PONV prophylaxis:  Ondansetron (or other 5HT-3) and Dexamethasone or Solumedrol       Postoperative Care      Consents  Anesthetic plan, risks, benefits and alternatives discussed with:  Patient..                          .

## 2018-07-17 NOTE — PLAN OF CARE
Patient has been sipping on water and ice chips. She ate a saltine cracker. She kept it down. She had 2000cc total iv fluid today. She was administered a Scop Patch. She will take it off in 3 days.  She did have several small emesis today.

## 2018-07-17 NOTE — H&P (VIEW-ONLY)
[facility]  [address] [address]  [phone]  Dept: [phone]    PRE-OP EVALUATION:  Today's date: 2018    Precious Reyes (: 1950) presents for pre-operative evaluation assessment as requested by Dr. Eagle.  She requires evaluation and anesthesia risk assessment prior to undergoing surgery/procedure for treatment of  rectocele.    Proposed Surgery/ Procedure: COLPORRHAPHY POSTERIOR  Date of Surgery/ Procedure: 18  Time of Surgery/ Procedure: 7:30  Hospital/Surgical Facility: [facility]    Primary Physician: Chau Cox  Type of Anesthesia Anticipated: General    Patient has a Health Care Directive or Living Will:  YES   Has healthcare directive.    1. NO - Do you have a history of heart attack, stroke, stent, bypass or surgery on an artery in the head, neck, heart or legs?  2. NO - Do you ever have any pain or discomfort in your chest?  3. NO - Do you have a history of  Heart Failure?  4. YES - ARE YOUR TROUBLED BY SHORTNESS OF BREATH WHEN WALKING ON THE LEVEL, UP A SLIGHT HILL OR AT NIGHT? No major issues  5. NO - Do you currently have a cold, bronchitis or other respiratory infection?  6. NO - Do you have a cough, shortness of breath or wheezing?  7. NO - Do you sometimes get pains in the calves of your legs when you walk?  8. NO - Do you or anyone in your family have previous history of blood clots?  9. NO - Do you or does anyone in your family have a serious bleeding problem such as prolonged bleeding following surgeries or cuts?  10. YES - HAVE YOU EVER HAD PROBLEMS WITH ANEMIA OR BEEN TOLD TO TAKE IRON PILLS? When she was young took some iron tablets  11. NO - Have you had any abnormal blood loss such as black, tarry or bloody stools, or abnormal vaginal bleeding?  12. NO - Have you ever had a blood transfusion?  13. NO - Have you or any of your relatives ever had problems with anesthesia?  14. YES - DO YOU HAVE SLEEP APNEA, EXCESSIVE SNORING  OR DAYTIME DROWSINESS? Has known LOLY  15. NO - Do you have any prosthetic heart valves?  16. NO - Do you have prosthetic joints?  17. NO - Is there any chance that you may be pregnant?      HPI:     HPI related to upcoming procedure: treatment of rectocele      HYPERTENSION - Patient has longstanding history of HTN , currently denies any symptoms referable to elevated blood pressure. Specifically denies chest pain, palpitations, dyspnea, orthopnea, PND or peripheral edema. Blood pressure readings have been in normal range. Current medication regimen is as listed below. Patient denies any side effects of medication.                                                                                                                                                                                          .    MEDICAL HISTORY:     Patient Active Problem List    Diagnosis Date Noted     Rectocele 06/04/2018     Priority: Medium     Hypertriglyceridemia 10/13/2015     Priority: Medium     OA (osteoarthritis) of finger, unspecified laterality 10/05/2015     Priority: Medium     Advanced directives, counseling/discussion 06/06/2012     Priority: Medium     Discussed advance care planning with patient; information given to patient to review. 6/6/2012    Advanced Care Directive received, [facility]. 12/7/2012 Nevaeh Regalado RN           CARDIOVASCULAR SCREENING; LDL GOAL LESS THAN 130 10/31/2010     Priority: Medium     Disease of jaw 08/26/2007     Priority: Medium     Problem list name updated by automated process. Provider to review       Abnormal weight gain 08/26/2007     Priority: Medium     Disorder of bone and cartilage 06/21/2007     Priority: Medium     Problem list name updated by automated process. Provider to review       Actinic keratosis 08/09/2006     Priority: Medium     Dorsum left foot 4 mm.       RIGHT KNEE PAIN AND LUMP 08/09/2006     Priority: Medium     Fell 9/05 trying to catch  grandchild from going into the street.  Landed on knees and had large hematomas, left worse than right.  8/06 left knee swollen with bony lump on patella, no pain or difficulty moving or bending.       RECTAL PAIN 08/09/2006     Priority: Medium     Other malignant neoplasm of skin, site unspecified 05/03/2005     Priority: Medium     Non-melanoma skin cancer, Pt is checking to learn what  kind of cancer  Problem list name updated by automated process. Provider to review and confirm       Essential hypertension 05/03/2005     Priority: Medium     atenolol  Problem list name updated by automated process. Provider to review       Urinary incontinence 05/03/2005     Priority: Medium     detrol 7/04  Problem list name updated by automated process. Provider to review        Past Medical History:   Diagnosis Date     Cholecystitis, unspecified 9/05     Malignant melanoma (H)      Other malignant neoplasm of skin, site unspecified ??????1995    Non-melanoma skin cancer, Pt is checking to learn what  kind of cancer     Unspecified essential hypertension     atenolol     URINARY INCONTINENCE 5/3/2005    detrol 7/04     Urinary tract infection, site not specified 7/04    Recurrent UTI's     Past Surgical History:   Procedure Laterality Date     BIOPSY BREAST       COLONOSCOPY  age 49    repeat 10 years, done at outside hospital     HC EXCISION BREAST LESION, OPEN >=1      5 biopsies all neg     HC REMOVAL GALLBLADDER  9 05    Cholecystectomy     HYSTERECTOMY, PAP NO LONGER INDICATED  07/2004    TVH-BSO, Lynx Pubovaginal sling     Current Outpatient Prescriptions   Medication Sig Dispense Refill     ASPIRIN 81 MG OR TABS ONE DAILY 100 3     atenolol (TENORMIN) 50 MG tablet Take 1.5 tablets (75 mg) by mouth daily 135 tablet 3     cetirizine (ZYRTEC) 10 MG tablet Take 10 mg by mouth daily       hydrochlorothiazide (HYDRODIURIL) 25 MG tablet Take 1 tablet (25 mg) by mouth daily 90 tablet 3     IBUPROFEN 200 MG OR TABS TAKE 1 TO 2  "TABLETS EVERY 4 TO 6 HOURS AS NEEDED WITH FOOD  0     tolterodine (DETROL LA) 4 MG 24 hr capsule Take 1 capsule (4 mg) by mouth daily 90 capsule 3     VITAMIN D PO Take 1,000 Units by mouth daily        ORDER FOR DME needs have cpap machine and mask, last one 10 years old, not fitting right 1 0     OTC products: None, except as noted above    Allergies   Allergen Reactions     Adhesive Tape Rash     Pt states it is Latex, not tape.     Latex Rash     Sulfa Drugs Rash      Latex Allergy: YES: Precautions to take: patient had local reaction she states to latex and avoids certain materials.  No shortness of breath    Social History   Substance Use Topics     Smoking status: Never Smoker     Smokeless tobacco: Never Used     Alcohol use Yes      Comment: 0-2 drinks per week     History   Drug Use No       REVIEW OF SYSTEMS:   Review Of Systems   Constitutional: negative  Skin: negative  Eyes: negative  Ears/Nose/Throat: negative  Respiratory: No shortness of breath, dyspnea on exertion, cough, or hemoptysis  Cardiovascular: negative  Gastrointestinal: negative  Genitourinary: has rectocele  Musculoskeletal: negative  Neurologic: negative  Psychiatric: negative  Hematologic/Lymphatic/Immunologic: negative  Endocrine: negative      EXAM:   /68 (Cuff Size: Adult Large)  Pulse 64  Temp 97.7  F (36.5  C) (Tympanic)  Ht 5' 2.5\" (1.588 m)  Wt 176 lb (79.8 kg)  BMI 31.68 kg/m2    GENERAL APPEARANCE: healthy, alert and no distress     EYES: EOMI, PERRL     HENT: ear canals and TM's normal and nose and mouth without ulcers or lesions     NECK: no adenopathy, no asymmetry, masses, or scars and thyroid normal to palpation     RESP: lungs clear to auscultation - no rales, rhonchi or wheezes     CV: regular rates and rhythm, normal S1 S2, no S3 or S4 and no murmur, click or rub     ABDOMEN:  soft, nontender, no HSM or masses and bowel sounds normal     MS: extremities normal- no gross deformities noted, no evidence of " inflammation in joints, FROM in all extremities.     SKIN: no suspicious lesions or rashes     NEURO: Normal strength and tone, sensory exam grossly normal, mentation intact and speech normal     PSYCH: mentation appears normal. and affect normal/bright     LYMPHATICS: No cervical adenopathy    DIAGNOSTICS:   EKG: sinus bradycardia, left axis deviation, no significant change from prior ekg, no signs of ischemia, no LVH by voltage criteria, unchanged from previous tracings    Recent Labs   Lab Test  10/10/17   1028  10/12/16   1102   09/30/14   0951   09/26/10   1720   HGB   --    --    --   14.8   --   15.0   PLT   --    --    --   232   --   280   NA  143  139   < >  142   < >  140   POTASSIUM  3.9  3.7   < >  3.8   < >  4.1   CR  0.92  0.91   < >  0.80   < >  0.68    < > = values in this interval not displayed.        IMPRESSION:   Reason for surgery/procedure: rectocele    The proposed surgical procedure is considered INTERMEDIATE risk.    REVISED CARDIAC RISK INDEX  The patient has the following serious cardiovascular risks for perioperative complications such as (MI, PE, VFib and 3  AV Block):    INTERPRETATION: 0 risks: Class I (very low risk - 0.4% complication rate)    The patient has the following additional risks for perioperative complications:        ICD-10-CM    1. Preop general physical exam Z01.818 Basic metabolic panel     EKG 12-lead complete w/read - Clinics   2. Rectocele N81.6    3. Essential hypertension I10 Basic metabolic panel     EKG 12-lead complete w/read - Clinics       RECOMMENDATIONS:         --Patient is to take all scheduled medications on the day of surgery EXCEPT for modifications listed below.    APPROVAL GIVEN to proceed with proposed procedure, without further diagnostic evaluation       Signed Electronically by: Chau Cox MD    Copy of this evaluation report is provided to requesting physician.    [facility] Preop Guidelines    Revised Cardiac Risk Index

## 2018-07-17 NOTE — IP AVS SNAPSHOT
MRN:[id number]                      After Visit Summary   7/17/2018    Precious Reyes    MRN: [id number]           Thank you!     Thank you for choosing [facility] for your care. Our goal is always to provide you with excellent care. Hearing back from our patients is one way we can continue to improve our services. Please take a few minutes to complete the written survey that you may receive in the mail after you visit with us. Thank you!        Patient Information     Date Of Birth          1950        About your hospital stay     You were admitted on:  July 17, 2018 You last received care in the:  [facility] PreOP/Phase II    You were discharged on:  July 17, 2018       Who to Call     For medical emergencies, please call 911.  For non-urgent questions about your medical care, please call your primary care provider or clinic, [phone]  For questions related to your surgery, please call your surgery clinic        Attending Provider     Provider Specialty    Dora Eagle MD OB/Gyn       Primary Care Provider Office Phone # Fax #    Chau Cox [phone]      After Care Instructions     Discharge Instructions       Resume pre procedure diet            Discharge Instructions       Pelvic Rest. No tampons, douching or intercourse for  6  weeks.            Discharge Instructions       Patient to arrange follow up appointment in 2  weeks            No alcohol       NO ALCOHOL for 24 hours post procedure            No driving or operating machinery       No driving or operating machinery until day after procedure            No lifting       No lifting over 10 pounds and no strenuous physical activity.  For 6 weeks                  Further instructions from your care team                           Same Day Surgery Discharge Instructions  Special Precautions After Surgery - Adult    1. It is not unusual to feel lightheaded or faint, up to 24 hours after  surgery or while taking pain medication.  If you have these symptoms; sit for a few minutes before standing and have someone assist you when getting up.  2. You should rest and relax for the next 24 hours and must have someone stay with you for at least 24 hours after your discharge.  3. DO NOT DRIVE any vehicle or operate mechanical equipment for 24 hours following the end of your surgery.  DO NOT DRIVE while taking narcotic pain medications that have been prescribed by your physician.  If you had a limb operated on, you must be able to use it fully to drive.  4. DO NOT drink alcoholic beverages for 24 hours following surgery or while taking prescription pain medication.  5. Drink clear liquids (apple juice, ginger ale, broth, 7-Up, etc.).  Progress to your regular diet as you feel able.  6. Any questions call your physician and do not make important decisions for 24 hours.    ACTIVITY  ? Rest today.  No diet restrictions.  ? Resume activity as tolerated.  ? No lifting more than 10 pounds for 6 weeks.  ? Restrictions: Pelvic rest for 6 weeks (mo intercourse,tampons,douche)     INCISIONAL CARE  ? May shower after 24 hours.  ? Be alert for signs of infection:  redness, swelling, heat, drainage of pus, and/or elevated temperature.  Contact your doctor if these occur.        Call for an appointment to return to the clinic   2 weeks.    Medications:  ? Oxycodone:  Next dose: as needed every 4 hours.  ? Senna 1-2 times a day.  ? Follow the instructions on the bottle.     Additional discharge instructions:  As written by MD.  __________________________________________________________________________________________________________________________________  IMPORTANT NUMBERS:    [facility] Main Number:  [phone], [phone]  Pharmacy:  [phone]  Same Day Surgery:  [phone], Monday - Friday until 8:30 p.m.  Urgent Care:  [phone]  Emergency Room:  [phone]      [facility]:  [phone]               "                                                               [facility]:  [phone] option 1  [facility]:  [phone]     OB Clinic:  [phone]   Surgery Specialty Clinic:  [phone]   Home Medical Equipment: [phone]  [facility]:  [phone]        Pending Results     No orders found from 7/15/2018 to 7/18/2018.            Admission Information     Date & Time Provider Department Dept. Phone    7/17/2018 Dora Eagle MD [facility] PreOP/Phase [phone]      Your Vitals Were     Blood Pressure Temperature Respirations Height Weight Pulse Oximetry    111/65 96  F (35.6  C) (Oral) 12 1.588 m (5' 2.52\") 79.8 kg (175 lb 14.8 oz) 100%    BMI (Body Mass Index)                   31.64 kg/m2           Care EveryWhere ID     This is your Care EveryWhere ID. This could be used by other organizations to access your [facility] medical records  [id number]        Equal Access to Services     [facility] AH: Hadii tiffany sheikh Sorob, waaxda luqadaha, qaybta kaalmajacqueline adesamra, raymond curry . So [facility] [phone].    ATENCIÓN: Si habla español, tiene a farooq disposición servicios gratuitos de asistencia lingüística. Llame al [phone].    We comply with applicable federal civil rights laws and [place] laws. We do not discriminate on the basis of race, color, national origin, age, disability, sex, sexual orientation, or gender identity.               Review of your medicines      START taking        Dose / Directions    oxyCODONE IR 5 MG tablet   Commonly known as:  ROXICODONE   Used for:  Rectocele        Dose:  5 mg   Take 1 tablet (5 mg) by mouth every 4 hours as needed for moderate to severe pain (Moderate to Severe)   Quantity:  20 tablet   Refills:  0       senna-docusate 8.6-50 MG per tablet   Commonly known as:  SENOKOT-S;PERICOLACE   Used for:  Rectocele        Dose:  1-2 tablet   Take 1-2 tablets by " mouth 2 times daily to prevent or treat constipation.   Quantity:  60 tablet   Refills:  1         CONTINUE these medicines which may have CHANGED, or have new prescriptions. If we are uncertain of the size of tablets/capsules you have at home, strength may be listed as something that might have changed.        Dose / Directions    * ibuprofen 200 MG tablet   Commonly known as:  ADVIL/MOTRIN   This may have changed:  Another medication with the same name was added. Make sure you understand how and when to take each.        TAKE 1 TO 2 TABLETS EVERY 4 TO 6 HOURS AS NEEDED WITH FOOD   Refills:  0       * ibuprofen 600 MG tablet   Commonly known as:  ADVIL/MOTRIN   This may have changed:  You were already taking a medication with the same name, and this prescription was added. Make sure you understand how and when to take each.   Used for:  Rectocele        Dose:  600 mg   Take 1 tablet (600 mg) by mouth every 6 hours as needed for pain (mild)   Quantity:  30 tablet   Refills:  1       * Notice:  This list has 2 medication(s) that are the same as other medications prescribed for you. Read the directions carefully, and ask your doctor or other care provider to review them with you.      CONTINUE these medicines which have NOT CHANGED        Dose / Directions    aspirin 81 MG tablet        ONE DAILY   Quantity:  100   Refills:  3       atenolol 50 MG tablet   Commonly known as:  TENORMIN   Used for:  Essential hypertension        Dose:  75 mg   Take 1.5 tablets (75 mg) by mouth daily   Quantity:  135 tablet   Refills:  3       cetirizine 10 MG tablet   Commonly known as:  zyrTEC        Dose:  10 mg   Take 10 mg by mouth daily   Refills:  0       hydrochlorothiazide 25 MG tablet   Commonly known as:  HYDRODIURIL   Used for:  Essential hypertension with goal blood pressure less than 140/90        Dose:  25 mg   Take 1 tablet (25 mg) by mouth daily   Quantity:  90 tablet   Refills:  3       order for DME   Used for:  LOLY  (obstructive sleep apnea)        needs have cpap machine and mask, last one 10 years old, not fitting right   Quantity:  1   Refills:  0       tolterodine 4 MG 24 hr capsule   Commonly known as:  DETROL LA   Used for:  Urgency of urination, Urge incontinence        Dose:  4 mg   Take 1 capsule (4 mg) by mouth daily   Quantity:  90 capsule   Refills:  3       VITAMIN D PO        Dose:  1000 Units   Take 1,000 Units by mouth daily   Refills:  0            Where to get your medicines      These medications were sent to [facility] [place], [place] - [address]  [address] [address]     Phone:  [phone]     ibuprofen 600 MG tablet    senna-docusate 8.6-50 MG per tablet         Some of these will need a paper prescription and others can be bought over the counter. Ask your nurse if you have questions.     Bring a paper prescription for each of these medications     oxyCODONE IR 5 MG tablet                Protect others around you: Learn how to safely use, store and throw away your medicines at www.disposemymeds.org.        Information about OPIOIDS     PRESCRIPTION OPIOIDS: WHAT YOU NEED TO KNOW   We gave you an opioid (narcotic) pain medicine. It is important to manage your pain, but opioids are not always the best choice. You should first try all the other options your care team gave you. Take this medicine for as short a time (and as few doses) as possible.     These medicines have risks:    DO NOT drive when on new or higher doses of pain medicine. These medicines can affect your alertness and reaction times, and you could be arrested for driving under the influence (DUI). If you need to use opioids long-term, talk to your care team about driving.    DO NOT operate heave machinery    DO NOT do any other dangerous activities while taking these medicines.     DO NOT drink any alcohol while taking these medicines.      If the opioid prescribed includes acetaminophen, DO NOT take  with any other medicines that contain acetaminophen. Read all labels carefully. Look for the word  acetaminophen  or  Tylenol.  Ask your pharmacist if you have questions or are unsure.    You can get addicted to pain medicines, especially if you have a history of addiction (chemical, alcohol or substance dependence). Talk to your care team about ways to reduce this risk.    Store your pills in a secure place, locked if possible. We will not replace any lost or stolen medicine. If you don t finish your medicine, please throw away (dispose) as directed by your pharmacist. The [place] [employer]ution [employer] has more information about safe disposal: https://www.pca.[facility].us/living-green/managing-unwanted-medications.     All opioids tend to cause constipation. Drink plenty of water and eat foods that have a lot of fiber, such as fruits, vegetables, prune juice, apple juice and high-fiber cereal. Take a laxative (Miralax, milk of magnesia, Colace, Senna) if you don t move your bowels at least every other day.              Medication List: This is a list of all your medications and when to take them. Check marks below indicate your daily home schedule. Keep this list as a reference.      Medications           Morning Afternoon Evening Bedtime As Needed    aspirin 81 MG tablet   ONE DAILY                                atenolol 50 MG tablet   Commonly known as:  TENORMIN   Take 1.5 tablets (75 mg) by mouth daily                                cetirizine 10 MG tablet   Commonly known as:  zyrTEC   Take 10 mg by mouth daily                                hydrochlorothiazide 25 MG tablet   Commonly known as:  HYDRODIURIL   Take 1 tablet (25 mg) by mouth daily                                * ibuprofen 200 MG tablet   Commonly known as:  ADVIL/MOTRIN   TAKE 1 TO 2 TABLETS EVERY 4 TO 6 HOURS AS NEEDED WITH FOOD                                * ibuprofen 600 MG tablet   Commonly known as:  ADVIL/MOTRIN   Take 1 tablet  (600 mg) by mouth every 6 hours as needed for pain (mild)                                order for DME   needs have cpap machine and mask, last one 10 years old, not fitting right                                oxyCODONE IR 5 MG tablet   Commonly known as:  ROXICODONE   Take 1 tablet (5 mg) by mouth every 4 hours as needed for moderate to severe pain (Moderate to Severe)                                senna-docusate 8.6-50 MG per tablet   Commonly known as:  SENOKOT-S;PERICOLACE   Take 1-2 tablets by mouth 2 times daily to prevent or treat constipation.                                tolterodine 4 MG 24 hr capsule   Commonly known as:  DETROL LA   Take 1 capsule (4 mg) by mouth daily                                VITAMIN D PO   Take 1,000 Units by mouth daily                                * Notice:  This list has 2 medication(s) that are the same as other medications prescribed for you. Read the directions carefully, and ask your doctor or other care provider to review them with you.

## 2018-08-01 ENCOUNTER — OFFICE VISIT (OUTPATIENT)
Dept: OBGYN | Facility: CLINIC | Age: 68
End: 2018-08-01
Payer: COMMERCIAL

## 2018-08-01 VITALS
DIASTOLIC BLOOD PRESSURE: 68 MMHG | BODY MASS INDEX: 30.58 KG/M2 | HEIGHT: 63 IN | WEIGHT: 172.6 LBS | RESPIRATION RATE: 16 BRPM | TEMPERATURE: 97.9 F | HEART RATE: 59 BPM | SYSTOLIC BLOOD PRESSURE: 122 MMHG

## 2018-08-01 DIAGNOSIS — Z98.890 POSTOPERATIVE STATE: Primary | ICD-10-CM

## 2018-08-01 LAB
ALBUMIN UR-MCNC: NEGATIVE MG/DL
APPEARANCE UR: CLEAR
BACTERIA #/AREA URNS HPF: ABNORMAL /HPF
BILIRUB UR QL STRIP: NEGATIVE
COLOR UR AUTO: YELLOW
GLUCOSE UR STRIP-MCNC: NEGATIVE MG/DL
HGB UR QL STRIP: NEGATIVE
KETONES UR STRIP-MCNC: NEGATIVE MG/DL
LEUKOCYTE ESTERASE UR QL STRIP: ABNORMAL
NITRATE UR QL: NEGATIVE
NON-SQ EPI CELLS #/AREA URNS LPF: ABNORMAL /LPF
PH UR STRIP: 6 PH (ref 5–7)
RBC #/AREA URNS AUTO: ABNORMAL /HPF
SOURCE: ABNORMAL
SP GR UR STRIP: 1.02 (ref 1–1.03)
UROBILINOGEN UR STRIP-ACNC: 0.2 EU/DL (ref 0.2–1)
WBC #/AREA URNS AUTO: ABNORMAL /HPF

## 2018-08-01 PROCEDURE — 99024 POSTOP FOLLOW-UP VISIT: CPT | Performed by: OBSTETRICS & GYNECOLOGY

## 2018-08-01 PROCEDURE — 87086 URINE CULTURE/COLONY COUNT: CPT | Performed by: OBSTETRICS & GYNECOLOGY

## 2018-08-01 PROCEDURE — 81001 URINALYSIS AUTO W/SCOPE: CPT | Performed by: OBSTETRICS & GYNECOLOGY

## 2018-08-01 NOTE — MR AVS SNAPSHOT
"              After Visit Summary   8/1/2018    Precious Reyes    MRN: [id number]           Patient Information     Date Of Birth          1950        Visit Information        Provider Department      8/1/2018 11:00 AM Dora Eagle MD [facility]        Today's Diagnoses     Postoperative state    -  1       Follow-ups after your visit        Who to contact     If you have questions or need follow up information about today's clinic visit or your schedule please contact [facility] directly at [phone].  Normal or non-critical lab and imaging results will be communicated to you by MyChart, letter or phone within 4 business days after the clinic has received the results. If you do not hear from us within 7 days, please contact the clinic through MyChart or phone. If you have a critical or abnormal lab result, we will notify you by phone as soon as possible.  Submit refill requests through [employer] or call your pharmacy and they will forward the refill request to us. Please allow 3 business days for your refill to be completed.          Additional Information About Your Visit        Care EveryWhere ID     This is your Care EveryWhere ID. This could be used by other organizations to access your [facility] medical records  [id number]        Your Vitals Were     Pulse Temperature Respirations Height BMI (Body Mass Index)       59 97.9  F (36.6  C) (Tympanic) 16 5' 2.5\" (1.588 m) 31.07 kg/m2        Blood Pressure from Last 3 Encounters:   08/01/18 122/68   07/17/18 155/78   07/11/18 116/68    Weight from Last 3 Encounters:   08/01/18 172 lb 9.6 oz (78.3 kg)   07/17/18 175 lb 14.8 oz (79.8 kg)   07/11/18 176 lb (79.8 kg)              Today, you had the following     No orders found for display       Primary Care Provider Office Phone # Fax #    Chau Cox [phone] [address] [address]        Equal Access to Services     FIIRO " GAAR : Hadii tiffany leal kori Tan, waaxda luqadaha, qaybta kaalmada columbamarlenejacqueline, raymond moralesbeesiomara jaramillo. So [facility] [phone].    ATENCIÓN: Si habla español, tiene a farooq disposición servicios gratuitos de asistencia lingüística. Llame al [phone].    We comply with applicable federal civil rights laws and [place] laws. We do not discriminate on the basis of race, color, national origin, age, disability, sex, sexual orientation, or gender identity.            Thank you!     Thank you for choosing [facility]  for your care. Our goal is always to provide you with excellent care. Hearing back from our patients is one way we can continue to improve our services. Please take a few minutes to complete the written survey that you may receive in the mail after your visit with us. Thank you!             Your Updated Medication List - Protect others around you: Learn how to safely use, store and throw away your medicines at www.disposemymeds.org.          This list is accurate as of 8/1/18 11:16 AM.  Always use your most recent med list.                   Brand Name Dispense Instructions for use Diagnosis    aspirin 81 MG tablet     100    ONE DAILY        atenolol 50 MG tablet    TENORMIN    135 tablet    Take 1.5 tablets (75 mg) by mouth daily    Essential hypertension       cetirizine 10 MG tablet    zyrTEC     Take 10 mg by mouth daily        hydrochlorothiazide 25 MG tablet    HYDRODIURIL    90 tablet    Take 1 tablet (25 mg) by mouth daily    Essential hypertension with goal blood pressure less than 140/90       * ibuprofen 200 MG tablet    ADVIL/MOTRIN     TAKE 1 TO 2 TABLETS EVERY 4 TO 6 HOURS AS NEEDED WITH FOOD        * ibuprofen 600 MG tablet    ADVIL/MOTRIN    30 tablet    Take 1 tablet (600 mg) by mouth every 6 hours as needed for pain (mild)    Rectocele       order for DME     1    needs have cpap machine and mask, last one 10 years old, not fitting right    LOLY  (obstructive sleep apnea)       oxyCODONE IR 5 MG tablet    ROXICODONE    20 tablet    Take 1 tablet (5 mg) by mouth every 4 hours as needed for moderate to severe pain (Moderate to Severe)    Rectocele       senna-docusate 8.6-50 MG per tablet    SENOKOT-S;PERICOLACE    60 tablet    Take 1-2 tablets by mouth 2 times daily to prevent or treat constipation.    Rectocele       tolterodine 4 MG 24 hr capsule    DETROL LA    90 capsule    Take 1 capsule (4 mg) by mouth daily    Urgency of urination, Urge incontinence       VITAMIN D PO      Take 1,000 Units by mouth daily        * Notice:  This list has 2 medication(s) that are the same as other medications prescribed for you. Read the directions carefully, and ask your doctor or other care provider to review them with you.

## 2018-08-01 NOTE — NURSING NOTE
"Initial /68 (BP Location: Right arm, Patient Position: Chair, Cuff Size: Adult Regular)  Pulse 59  Temp 97.9  F (36.6  C) (Tympanic)  Resp 16  Ht 5' 2.5\" (1.588 m)  Wt 172 lb 9.6 oz (78.3 kg)  BMI 31.07 kg/m2 Estimated body mass index is 31.07 kg/(m^2) as calculated from the following:    Height as of this encounter: 5' 2.5\" (1.588 m).    Weight as of this encounter: 172 lb 9.6 oz (78.3 kg). .      "

## 2018-08-01 NOTE — PROGRESS NOTES
"Precious is a 67 year old female 2 weeks S/P posterior vaginal repair, complicated by no problems reported.  She is currently requiring nothing for pain management.  The pathology report showed none.  She is currently reporting having no discharge, moving her bowels nornally.    Exam: /68 (BP Location: Right arm, Patient Position: Chair, Cuff Size: Adult Regular)  Pulse 59  Temp 97.9  F (36.6  C) (Tympanic)  Resp 16  Ht 5' 2.5\" (1.588 m)  Wt 172 lb 9.6 oz (78.3 kg)  BMI 31.07 kg/m2  vaginal walls well supported and suture lines intact    POSTVOID RESIDUAL=36 cc    Assessment:  Postoperative state      Plan:  Pelvic rest for 6 weeks total.  Report excessive pain, fever, chills, bleeding, or foul wound odor promptly.  Follow-up visit in 4 weeks.  Dora Eagle MD  [facility]      "

## 2018-08-02 LAB
BACTERIA SPEC CULT: NO GROWTH
Lab: NORMAL
SPECIMEN SOURCE: NORMAL

## 2018-08-29 ENCOUNTER — OFFICE VISIT (OUTPATIENT)
Dept: OBGYN | Facility: CLINIC | Age: 68
End: 2018-08-29
Payer: COMMERCIAL

## 2018-08-29 VITALS
RESPIRATION RATE: 18 BRPM | SYSTOLIC BLOOD PRESSURE: 134 MMHG | DIASTOLIC BLOOD PRESSURE: 79 MMHG | BODY MASS INDEX: 30.83 KG/M2 | HEART RATE: 52 BPM | TEMPERATURE: 96.9 F | WEIGHT: 174 LBS | HEIGHT: 63 IN

## 2018-08-29 DIAGNOSIS — N89.8 GRANULATION TISSUE OF VAGINA: ICD-10-CM

## 2018-08-29 DIAGNOSIS — Z98.890 POSTOPERATIVE STATE: Primary | ICD-10-CM

## 2018-08-29 DIAGNOSIS — N95.2 ATROPHIC VAGINITIS: ICD-10-CM

## 2018-08-29 PROBLEM — N81.6 RECTOCELE: Status: RESOLVED | Noted: 2018-06-04 | Resolved: 2018-08-29

## 2018-08-29 PROCEDURE — 99024 POSTOP FOLLOW-UP VISIT: CPT | Performed by: OBSTETRICS & GYNECOLOGY

## 2018-08-29 RX ORDER — ESTRADIOL 0.5 MG/1
TABLET ORAL
Qty: 24 TABLET | Refills: 3 | Status: SHIPPED | OUTPATIENT
Start: 2018-08-29 | End: 2019-01-07

## 2018-08-29 NOTE — PROGRESS NOTES
"Precious is a 68 year old female 6 weeks S/P posterior repair, complicated by no problems reported.  She is currently requiring nothing for pain management.  The pathology report showed none.  She is currently reporting vaginal dryness.    Exam: /79 (BP Location: Right arm, Patient Position: Chair, Cuff Size: Adult Large)  Pulse 52  Temp 96.9  F (36.1  C) (Tympanic)  Resp 18  Ht 5' 2.5\" (1.588 m)  Wt 174 lb (78.9 kg)  BMI 31.32 kg/m2  vaginal walls well supported and granulation tissue noted along post wall; blanched with silver nitrate topical    Tissues atrophic    Assessment:  Postop  Granulation tissue  Atrophic vaginitis    Plan:  Recommend Estradiol 0.5mg tab PV twice a week for atrophy  F/u prn  Dora Eagle MD  [facility]      "

## 2018-08-29 NOTE — MR AVS SNAPSHOT
"              After Visit Summary   8/29/2018    Precious Reyes    MRN: [id number]           Patient Information     Date Of Birth          1950        Visit Information        Provider Department      8/29/2018 11:15 AM Dora Eagle MD [facility]        Today's Diagnoses     Postoperative state    -  1    Granulation tissue of vagina        Atrophic vaginitis           Follow-ups after your visit        Who to contact     If you have questions or need follow up information about today's clinic visit or your schedule please contact [facility] directly at [phone].  Normal or non-critical lab and imaging results will be communicated to you by MyChart, letter or phone within 4 business days after the clinic has received the results. If you do not hear from us within 7 days, please contact the clinic through MyChart or phone. If you have a critical or abnormal lab result, we will notify you by phone as soon as possible.  Submit refill requests through [employer] or call your pharmacy and they will forward the refill request to us. Please allow 3 business days for your refill to be completed.          Additional Information About Your Visit        Care EveryWhere ID     This is your Care EveryWhere ID. This could be used by other organizations to access your [place] medical records  [id number]        Your Vitals Were     Pulse Temperature Respirations Height BMI (Body Mass Index)       52 96.9  F (36.1  C) (Tympanic) 18 5' 2.5\" (1.588 m) 31.32 kg/m2        Blood Pressure from Last 3 Encounters:   08/29/18 134/79   08/01/18 122/68   07/17/18 155/78    Weight from Last 3 Encounters:   08/29/18 174 lb (78.9 kg)   08/01/18 172 lb 9.6 oz (78.3 kg)   07/17/18 175 lb 14.8 oz (79.8 kg)              We Performed the Following     CHEM CAUTERY GRANULATION TISSUE          Today's Medication Changes          These changes are accurate as of 8/29/18 11:53 AM.  If you have any " questions, ask your nurse or doctor.               Start taking these medicines.        Dose/Directions    estradiol 0.5 MG tablet   Commonly known as:  ESTRACE   Used for:  Atrophic vaginitis   Started by:  Dora Eagle MD        1 tab vaginally at bedtime twice a week   Quantity:  24 tablet   Refills:  3            Where to get your medicines      These medications were sent to [id number] [address] [facility]  [address] [facility] [address]     Phone:  [phone]     estradiol 0.5 MG tablet                Primary Care Provider Office Phone # Fax #    Chau Cox [phone] [address] [facility] [address]        Equal Access to Services     ISAAC LIM : Hadii tiffany leal hadluis angelo Sorob, waaxda luqadaha, qaybta kaalmada adeegyada, raymond curry . So [facility] [phone].    ATENCIÓN: Si habla español, tiene a farooq disposición servicios gratuitos de asistencia lingüística. [facility] [phone].    We comply with applicable federal civil rights laws and [place] laws. We do not discriminate on the basis of race, color, national origin, age, disability, sex, sexual orientation, or gender identity.            Thank you!     Thank you for choosing [facility]  for your care. Our goal is always to provide you with excellent care. Hearing back from our patients is one way we can continue to improve our services. Please take a few minutes to complete the written survey that you may receive in the mail after your visit with us. Thank you!             Your Updated Medication List - Protect others around you: Learn how to safely use, store and throw away your medicines at www.disposemymeds.org.          This list is accurate as of 8/29/18 11:53 AM.  Always use your most recent med list.                   Brand Name Dispense Instructions for use Diagnosis    aspirin 81 MG tablet     100    ONE DAILY         atenolol 50 MG tablet    TENORMIN    135 tablet    Take 1.5 tablets (75 mg) by mouth daily    Essential hypertension       cetirizine 10 MG tablet    zyrTEC     Take 10 mg by mouth daily        estradiol 0.5 MG tablet    ESTRACE    24 tablet    1 tab vaginally at bedtime twice a week    Atrophic vaginitis       hydrochlorothiazide 25 MG tablet    HYDRODIURIL    90 tablet    Take 1 tablet (25 mg) by mouth daily    Essential hypertension with goal blood pressure less than 140/90       * ibuprofen 200 MG tablet    ADVIL/MOTRIN     TAKE 1 TO 2 TABLETS EVERY 4 TO 6 HOURS AS NEEDED WITH FOOD        * ibuprofen 600 MG tablet    ADVIL/MOTRIN    30 tablet    Take 1 tablet (600 mg) by mouth every 6 hours as needed for pain (mild)    Rectocele       order for DME     1    needs have cpap machine and mask, last one 10 years old, not fitting right    LOLY (obstructive sleep apnea)       oxyCODONE IR 5 MG tablet    ROXICODONE    20 tablet    Take 1 tablet (5 mg) by mouth every 4 hours as needed for moderate to severe pain (Moderate to Severe)    Rectocele       senna-docusate 8.6-50 MG per tablet    SENOKOT-S;PERICOLACE    60 tablet    Take 1-2 tablets by mouth 2 times daily to prevent or treat constipation.    Rectocele       tolterodine 4 MG 24 hr capsule    DETROL LA    90 capsule    Take 1 capsule (4 mg) by mouth daily    Urgency of urination, Urge incontinence       VITAMIN D PO      Take 1,000 Units by mouth daily        * Notice:  This list has 2 medication(s) that are the same as other medications prescribed for you. Read the directions carefully, and ask your doctor or other care provider to review them with you.

## 2018-09-25 ENCOUNTER — OFFICE VISIT (OUTPATIENT)
Dept: FAMILY MEDICINE | Facility: CLINIC | Age: 68
End: 2018-09-25
Payer: COMMERCIAL

## 2018-09-25 VITALS
OXYGEN SATURATION: 98 % | HEART RATE: 56 BPM | WEIGHT: 174 LBS | RESPIRATION RATE: 10 BRPM | TEMPERATURE: 97.4 F | HEIGHT: 63 IN | DIASTOLIC BLOOD PRESSURE: 66 MMHG | SYSTOLIC BLOOD PRESSURE: 138 MMHG | BODY MASS INDEX: 30.83 KG/M2

## 2018-09-25 DIAGNOSIS — N39.41 URGE INCONTINENCE: ICD-10-CM

## 2018-09-25 DIAGNOSIS — Z13.6 CARDIOVASCULAR SCREENING; LDL GOAL LESS THAN 130: ICD-10-CM

## 2018-09-25 DIAGNOSIS — R73.9 HYPERGLYCEMIA: ICD-10-CM

## 2018-09-25 DIAGNOSIS — I10 ESSENTIAL HYPERTENSION: ICD-10-CM

## 2018-09-25 DIAGNOSIS — Z00.00 ENCOUNTER FOR ROUTINE ADULT HEALTH EXAMINATION WITHOUT ABNORMAL FINDINGS: Primary | ICD-10-CM

## 2018-09-25 DIAGNOSIS — Z23 NEED FOR VACCINATION: ICD-10-CM

## 2018-09-25 DIAGNOSIS — L82.1 SEBORRHEIC KERATOSIS: ICD-10-CM

## 2018-09-25 DIAGNOSIS — R39.15 URGENCY OF URINATION: ICD-10-CM

## 2018-09-25 DIAGNOSIS — I10 ESSENTIAL HYPERTENSION WITH GOAL BLOOD PRESSURE LESS THAN 140/90: ICD-10-CM

## 2018-09-25 DIAGNOSIS — Z23 NEED FOR PROPHYLACTIC VACCINATION AND INOCULATION AGAINST INFLUENZA: ICD-10-CM

## 2018-09-25 DIAGNOSIS — Z12.31 ENCOUNTER FOR SCREENING MAMMOGRAM FOR BREAST CANCER: ICD-10-CM

## 2018-09-25 LAB
ANION GAP SERPL CALCULATED.3IONS-SCNC: 6 MMOL/L (ref 3–14)
BUN SERPL-MCNC: 17 MG/DL (ref 7–30)
CALCIUM SERPL-MCNC: 8.1 MG/DL (ref 8.5–10.1)
CHLORIDE SERPL-SCNC: 103 MMOL/L (ref 94–109)
CHOLEST SERPL-MCNC: 190 MG/DL
CO2 SERPL-SCNC: 31 MMOL/L (ref 20–32)
CREAT SERPL-MCNC: 0.84 MG/DL (ref 0.52–1.04)
GFR SERPL CREATININE-BSD FRML MDRD: 67 ML/MIN/1.7M2
GLUCOSE SERPL-MCNC: 94 MG/DL (ref 70–99)
HBA1C MFR BLD: 5.5 % (ref 0–5.6)
HDLC SERPL-MCNC: 54 MG/DL
LDLC SERPL CALC-MCNC: 112 MG/DL
NONHDLC SERPL-MCNC: 136 MG/DL
POTASSIUM SERPL-SCNC: 3.1 MMOL/L (ref 3.4–5.3)
SODIUM SERPL-SCNC: 140 MMOL/L (ref 133–144)
TRIGL SERPL-MCNC: 122 MG/DL

## 2018-09-25 PROCEDURE — 36415 COLL VENOUS BLD VENIPUNCTURE: CPT | Performed by: FAMILY MEDICINE

## 2018-09-25 PROCEDURE — 80048 BASIC METABOLIC PNL TOTAL CA: CPT | Performed by: FAMILY MEDICINE

## 2018-09-25 PROCEDURE — 83036 HEMOGLOBIN GLYCOSYLATED A1C: CPT | Performed by: FAMILY MEDICINE

## 2018-09-25 PROCEDURE — 80061 LIPID PANEL: CPT | Performed by: FAMILY MEDICINE

## 2018-09-25 PROCEDURE — G0439 PPPS, SUBSEQ VISIT: HCPCS | Performed by: FAMILY MEDICINE

## 2018-09-25 PROCEDURE — 90714 TD VACC NO PRESV 7 YRS+ IM: CPT | Performed by: FAMILY MEDICINE

## 2018-09-25 PROCEDURE — 90662 IIV NO PRSV INCREASED AG IM: CPT | Performed by: FAMILY MEDICINE

## 2018-09-25 PROCEDURE — G0008 ADMIN INFLUENZA VIRUS VAC: HCPCS | Performed by: FAMILY MEDICINE

## 2018-09-25 PROCEDURE — 90472 IMMUNIZATION ADMIN EACH ADD: CPT | Performed by: FAMILY MEDICINE

## 2018-09-25 RX ORDER — ATENOLOL 50 MG/1
75 TABLET ORAL DAILY
Qty: 135 TABLET | Refills: 3 | Status: SHIPPED | OUTPATIENT
Start: 2018-09-25 | End: 2019-01-07

## 2018-09-25 RX ORDER — HYDROCHLOROTHIAZIDE 25 MG/1
25 TABLET ORAL DAILY
Qty: 90 TABLET | Refills: 3 | Status: SHIPPED | OUTPATIENT
Start: 2018-09-25 | End: 2019-01-07

## 2018-09-25 RX ORDER — TOLTERODINE 4 MG/1
4 CAPSULE, EXTENDED RELEASE ORAL DAILY
Qty: 90 CAPSULE | Refills: 3 | Status: SHIPPED | OUTPATIENT
Start: 2018-09-25 | End: 2019-01-07

## 2018-09-25 NOTE — PATIENT INSTRUCTIONS
Please go to lab.    I have refilled your medications for the year.    You can get your mammogram done next spring      Thank you for choosing [facility].  You may be receiving a survey in the mail from Tony Chambers regarding your visit today.  Please take a few minutes to complete and return the survey to let us know how we are doing.      If you have questions or concerns, please contact us via [employer] or you can contact your care team at [phone].    Our Clinic hours are:  Monday 6:40 am  to 7:00 pm  Tuesday -Friday 6:40 am to 5:00 pm    The [place] outpatient lab hours are:  Monday - Friday 6:10 am to 4:45 pm  Saturdays 7:00 am to 11:00 am  Appointments are required, call [phone]    If you have clinical questions after hours or would like to schedule an appointment,  call the clinic at [phone].    Preventive Health Recommendations    Female Ages 65 +    Yearly exam:     See your health care provider every year in order to  o Review health changes.   o Discuss preventive care.    o Review your medicines if your doctor has prescribed any.      You no longer need a yearly Pap test unless you've had an abnormal Pap test in the past 10 years. If you have vaginal symptoms, such as bleeding or discharge, be sure to talk with your provider about a Pap test.      Every 1 to 2 years, have a mammogram.  If you are over 69, talk with your health care provider about whether or not you want to continue having screening mammograms.      Every 10 years, have a colonoscopy. Or, have a yearly FIT test (stool test). These exams will check for colon cancer.       Have a cholesterol test every 5 years, or more often if your doctor advises it.       Have a diabetes test (fasting glucose) every three years. If you are at risk for diabetes, you should have this test more often.       At age 65, have a bone density scan (DEXA) to check for osteoporosis (brittle bone disease).    Shots:    Get a flu shot each  year.    Get a tetanus shot every 10 years.    Talk to your doctor about your pneumonia vaccines. There are now two you should receive - Pneumovax (PPSV 23) and Prevnar (PCV 13).    Talk to your pharmacist about the shingles vaccine.    Talk to your doctor about the hepatitis B vaccine.    Nutrition:     Eat at least 5 servings of fruits and vegetables each day.      Eat whole-grain bread, whole-wheat pasta and brown rice instead of white grains and rice.      Get adequate Calcium and Vitamin D.     Lifestyle    Exercise at least 150 minutes a week (30 minutes a day, 5 days a week). This will help you control your weight and prevent disease.      Limit alcohol to one drink per day.      No smoking.       Wear sunscreen to prevent skin cancer.       See your dentist twice a year for an exam and cleaning.      See your eye doctor every 1 to 2 years to screen for conditions such as glaucoma, macular degeneration and cataracts.

## 2018-09-25 NOTE — MR AVS SNAPSHOT
After Visit Summary   9/25/2018    Precious Reyes    MRN: [id number]           Patient Information     Date Of Birth          1950        Visit Information        Provider Department      9/25/2018 9:00 AM Chau Cox MD [facility]        Today's Diagnoses     Encounter for routine adult health examination without abnormal findings    -  1    Essential hypertension        CARDIOVASCULAR SCREENING; LDL GOAL LESS THAN 130        Hyperglycemia        Urgency of urination        Urge incontinence        Essential hypertension with goal blood pressure less than 140/90        Encounter for screening mammogram for breast cancer          Care Instructions    Please go to lab.    I have refilled your medications for the year.    You can get your mammogram done next spring      Thank you for choosing [facility].  You may be receiving a survey in the mail from [employer] regarding your visit today.  Please take a few minutes to complete and return the survey to let us know how we are doing.      If you have questions or concerns, please contact us via [employer] or you can contact your care team at [phone].    Our Clinic hours are:  Monday 6:40 am  to 7:00 pm  Tuesday -Friday 6:40 am to 5:00 pm    The [place] outpatient lab hours are:  Monday - Friday 6:10 am to 4:45 pm  Saturdays 7:00 am to 11:00 am  Appointments are required, call [phone]    If you have clinical questions after hours or would like to schedule an appointment,  call the clinic at [phone].    Preventive Health Recommendations    Female Ages 65 +    Yearly exam:     See your health care provider every year in order to  o Review health changes.   o Discuss preventive care.    o Review your medicines if your doctor has prescribed any.      You no longer need a yearly Pap test unless you've had an abnormal Pap test in the past 10 years. If you have vaginal symptoms, such as bleeding or discharge, be sure to  talk with your provider about a Pap test.      Every 1 to 2 years, have a mammogram.  If you are over 69, talk with your health care provider about whether or not you want to continue having screening mammograms.      Every 10 years, have a colonoscopy. Or, have a yearly FIT test (stool test). These exams will check for colon cancer.       Have a cholesterol test every 5 years, or more often if your doctor advises it.       Have a diabetes test (fasting glucose) every three years. If you are at risk for diabetes, you should have this test more often.       At age 65, have a bone density scan (DEXA) to check for osteoporosis (brittle bone disease).    Shots:    Get a flu shot each year.    Get a tetanus shot every 10 years.    Talk to your doctor about your pneumonia vaccines. There are now two you should receive - Pneumovax (PPSV 23) and Prevnar (PCV 13).    Talk to your pharmacist about the shingles vaccine.    Talk to your doctor about the hepatitis B vaccine.    Nutrition:     Eat at least 5 servings of fruits and vegetables each day.      Eat whole-grain bread, whole-wheat pasta and brown rice instead of white grains and rice.      Get adequate Calcium and Vitamin D.     Lifestyle    Exercise at least 150 minutes a week (30 minutes a day, 5 days a week). This will help you control your weight and prevent disease.      Limit alcohol to one drink per day.      No smoking.       Wear sunscreen to prevent skin cancer.       See your dentist twice a year for an exam and cleaning.      See your eye doctor every 1 to 2 years to screen for conditions such as glaucoma, macular degeneration and cataracts.          Follow-ups after your visit        Follow-up notes from your care team     Return in about 1 year (around 9/25/2019).      Future tests that were ordered for you today     Open Future Orders        Priority Expected Expires Ordered    *MA Screening Digital Bilateral Routine 5/6/2019 9/25/2019 9/25/2018           "  Who to contact     If you have questions or need follow up information about today's clinic visit or your schedule please contact [facility] directly at [phone].  Normal or non-critical lab and imaging results will be communicated to you by [employer], letter or phone within 4 business days after the clinic has received the results. If you do not hear from us within 7 days, please contact the clinic through [employer] or phone. If you have a critical or abnormal lab result, we will notify you by phone as soon as possible.  Submit refill requests through [employer] or call your pharmacy and they will forward the refill request to us. Please allow 3 business days for your refill to be completed.          Additional Information About Your Visit        [employer] Information     [employer] gives you secure access to your electronic health record. If you see a primary care provider, you can also send messages to your care team and make appointments. If you have questions, please call your primary care clinic.  If you do not have a primary care provider, please call [phone] and they will assist you.        Care EveryWhere ID     This is your Care EveryWhere ID. This could be used by other organizations to access your [place] medical records  [id number]        Your Vitals Were     Pulse Temperature Respirations Height Pulse Oximetry BMI (Body Mass Index)    56 97.4  F (36.3  C) (Tympanic) 10 5' 2.5\" (1.588 m) 98% 31.32 kg/m2       Blood Pressure from Last 3 Encounters:   09/25/18 138/66   08/29/18 134/79   08/01/18 122/68    Weight from Last 3 Encounters:   09/25/18 174 lb (78.9 kg)   08/29/18 174 lb (78.9 kg)   08/01/18 172 lb 9.6 oz (78.3 kg)              We Performed the Following     Basic metabolic panel     Hemoglobin A1c     Lipid panel reflex to direct LDL Fasting          Today's Medication Changes          These changes are accurate as of 9/25/18  9:38 AM.  If you have any questions, ask your nurse " or doctor.               These medicines have changed or have updated prescriptions.        Dose/Directions    atenolol 50 MG tablet   Commonly known as:  TENORMIN   This may have changed:  additional instructions   Used for:  Essential hypertension   Changed by:  Chau Cox MD        Dose:  75 mg   Take 1.5 tablets (75 mg) by mouth daily Hold on file until needed   Quantity:  135 tablet   Refills:  3       hydrochlorothiazide 25 MG tablet   Commonly known as:  HYDRODIURIL   This may have changed:  additional instructions   Used for:  Essential hypertension with goal blood pressure less than 140/90   Changed by:  Chau Cox MD        Dose:  25 mg   Take 1 tablet (25 mg) by mouth daily Hold on file until needed   Quantity:  90 tablet   Refills:  3       tolterodine 4 MG 24 hr capsule   Commonly known as:  DETROL LA   This may have changed:  additional instructions   Used for:  Urgency of urination, Urge incontinence   Changed by:  Chau Cox MD        Dose:  4 mg   Take 1 capsule (4 mg) by mouth daily Hold on file until needed   Quantity:  90 capsule   Refills:  3            Where to get your medicines      These medications were sent to [facility] [address] [place] [address]  [address] [address]     Phone:  [phone]     atenolol 50 MG tablet    hydrochlorothiazide 25 MG tablet    tolterodine 4 MG 24 hr capsule                Primary Care Provider Office Phone # Fax #    Chau Cox [phone] [address] [facility] [address]        Equal Access to Services     [facility] AH: Hadii tiffany ku hadasho Sodeionali, waaxda luqadaha, qaybta kaalmada adeegyada, raymond jaramillo. So [facility] [phone].    ATENCIÓN: Si habla español, tiene a afrooq disposición servicios gratuitos de asistencia lingüística. Llame al [phone].    We comply with applicable federal civil rights laws and [place] laws. We do not  discriminate on the basis of race, color, national origin, age, disability, sex, sexual orientation, or gender identity.            Thank you!     Thank you for choosing [facility]  for your care. Our goal is always to provide you with excellent care. Hearing back from our patients is one way we can continue to improve our services. Please take a few minutes to complete the written survey that you may receive in the mail after your visit with us. Thank you!             Your Updated Medication List - Protect others around you: Learn how to safely use, store and throw away your medicines at www.disposemymeds.org.          This list is accurate as of 9/25/18  9:38 AM.  Always use your most recent med list.                   Brand Name Dispense Instructions for use Diagnosis    aspirin 81 MG tablet     100    ONE DAILY        atenolol 50 MG tablet    TENORMIN    135 tablet    Take 1.5 tablets (75 mg) by mouth daily Hold on file until needed    Essential hypertension       cetirizine 10 MG tablet    zyrTEC     Take 10 mg by mouth daily        estradiol 0.5 MG tablet    ESTRACE    24 tablet    1 tab vaginally at bedtime twice a week    Atrophic vaginitis       hydrochlorothiazide 25 MG tablet    HYDRODIURIL    90 tablet    Take 1 tablet (25 mg) by mouth daily Hold on file until needed    Essential hypertension with goal blood pressure less than 140/90       ibuprofen 200 MG tablet    ADVIL/MOTRIN     TAKE 1 TO 2 TABLETS EVERY 4 TO 6 HOURS AS NEEDED WITH FOOD        order for DME     1    needs have cpap machine and mask, last one 10 years old, not fitting right    LOLY (obstructive sleep apnea)       tolterodine 4 MG 24 hr capsule    DETROL LA    90 capsule    Take 1 capsule (4 mg) by mouth daily Hold on file until needed    Urgency of urination, Urge incontinence       VITAMIN D PO      Take 1,000 Units by mouth daily

## 2018-09-25 NOTE — NURSING NOTE
Screening Questionnaire for Adult Immunization    Are you sick today?   No   Do you have allergies to medications, food, a vaccine component or latex?   No   Have you ever had a serious reaction after receiving a vaccination?   No   Do you have a long-term health problem with heart disease, lung disease, asthma, kidney disease, metabolic disease (e.g. diabetes), anemia, or other blood disorder?   No   Do you have cancer, leukemia, HIV/AIDS, or any other immune system problem?   No   In the past 3 months, have you taken medications that affect  your immune system, such as prednisone, other steroids, or anticancer drugs; drugs for the treatment of rheumatoid arthritis, Crohn s disease, or psoriasis; or have you had radiation treatments?   No   Have you had a seizure, or a brain or other nervous system problem?   No   During the past year, have you received a transfusion of blood or blood     products, or been given immune (gamma) globulin or antiviral drug?   No   For women: Are you pregnant or is there a chance you could become        pregnant during the next month?   No   Have you received any vaccinations in the past 4 weeks?   No     Immunization questionnaire answers were all negative.        Per orders of Dr. Cox, injection of Td and Flu given by Radha Story.   Patient instructed to remain in clinic for 15 minutes afterwards, and to report any adverse reaction to me immediately.       Screening performed by Piper Fay on 9/25/2018 at 9:17 AM.

## 2018-09-25 NOTE — PROGRESS NOTES
SUBJECTIVE:   Precious Reyes is a 68 year old female who presents for Preventive Visit.    Are you in the first 12 months of your Medicare Part B coverage?  No    Healthy Habits:    Do you get at least three servings of calcium containing foods daily (dairy, green leafy vegetables, etc.)? yes    Amount of exercise or daily activities, outside of work: 2 day(s) per week    Problems taking medications regularly No    Medication side effects: No    Have you had an eye exam in the past two years? no    Do you see a dentist twice per year? yes    Do you have sleep apnea, excessive snoring or daytime drowsiness?C-pap      Ability to successfully perform activities of daily living: Yes, no assistance needed    Home safety:  lack of grab bars in the bathroom     Hearing impairment: Yes, left hearing loss    Fall risk:  Fallen 2 or more times in the past year?: No  Any fall with injury in the past year?: No        COGNITIVE SCREEN  1) Repeat 3 items (Leader, Season, Table)    2) Clock draw: NORMAL  3) 3 item recall: Recalls 3 objects  Results: 3 items recalled: COGNITIVE IMPAIRMENT LESS LIKELY    Mini-CogTM Copyright S Greta. Licensed by the author for use in [facility]; reprinted with permission (ramiro@[facility]). All rights reserved.          Reviewed and updated as needed this visit by clinical staff  Tobacco  Allergies  Meds  Med Hx  Surg Hx  Fam Hx  Soc Hx        Reviewed and updated as needed this visit by Provider        Social History   Substance Use Topics     Smoking status: Never Smoker     Smokeless tobacco: Never Used     Alcohol use Yes      Comment: 0-2 drinks per week       If you drink alcohol do you typically have >3 drinks per day or >7 drinks per week? No                        Today's PHQ-2 Score:   PHQ-2 ( 1999 Pf[employer]) 9/25/2018 10/10/2017   Q1: Little interest or pleasure in doing things 0 0   Q2: Feeling down, depressed or hopeless 0 0   PHQ-2 Score 0 0       Do you feel safe in your  "environment - Yes    Do you have a Health Care Directive?: Yes: Advance Directive has been received and scanned.    Current providers sharing in care for this patient include:   Patient Care Team:  Chau Cox MD as PCP - General  Tiesha Garcia PA as Physician Assistant (Physician Assistant)    The following health maintenance items are reviewed in Epic and correct as of today:  Health Maintenance   Topic Date Due     SKIN CANCER SCREENING Q6 MOS (NO IN BASKET)  1950     HEPATITIS C SCREENING  08/27/1967     SKIN CANCER SCREENING Q1 YR (NO IN BASKET)  07/15/2016     ADVANCE DIRECTIVE PLANNING Q5 YRS  06/06/2017     INFLUENZA VACCINE (1) 09/01/2018     TETANUS IMMUNIZATION (SYSTEM ASSIGNED)  09/16/2018     FALL RISK ASSESSMENT  10/10/2018     PHQ-2 Q1 YR  10/10/2018     COLONOSCOPY Q10 YR  10/01/2019     MAMMO SCREEN Q2 YR (SYSTEM ASSIGNED)  05/08/2020     LIPID SCREEN Q5 YR FEMALE (SYSTEM ASSIGNED)  10/10/2022     DEXA SCAN SCREENING (SYSTEM ASSIGNED)  Completed     PNEUMOCOCCAL  Completed             ROS:  Review Of Systems  Skin: back skin lesion wondering what it is  Eyes: negative  Ears/Nose/Throat: negative  Respiratory: No shortness of breath, dyspnea on exertion, cough, or hemoptysis  Cardiovascular: negative  Gastrointestinal: negative  Genitourinary: negative  Musculoskeletal: negative  Neurologic: negative  Psychiatric: negative  Hematologic/Lymphatic/Immunologic: negative  Endocrine: negative      OBJECTIVE:   /66  Pulse 56  Temp 97.4  F (36.3  C) (Tympanic)  Resp 10  Ht 5' 2.5\" (1.588 m)  Wt 174 lb (78.9 kg)  SpO2 98%  BMI 31.32 kg/m2 Estimated body mass index is 31.32 kg/(m^2) as calculated from the following:    Height as of this encounter: 5' 2.5\" (1.588 m).    Weight as of this encounter: 174 lb (78.9 kg).  EXAM:   GENERAL: healthy, alert and no distress  EYES: Eyes grossly normal to inspection, PERRL and conjunctivae and sclerae normal  HENT: ear canals and TM's " normal, nose and mouth without ulcers or lesions  NECK: no adenopathy, no asymmetry, masses, or scars and thyroid normal to palpation  RESP: lungs clear to auscultation - no rales, rhonchi or wheezes  BREAST: NE  CV: regular rate and rhythm, normal S1 S2, no S3 or S4, no murmur, click or rub, no peripheral edema and peripheral pulses strong  ABDOMEN: soft, nontender, no hepatosplenomegaly, no masses and bowel sounds normal   (female): NE  MS: no gross musculoskeletal defects noted, no edema  SKIN: no suspicious lesions or rashes  SKIN: noted one SK on right upper shoulder, benign and explained to the patient  NEURO: Normal strength and tone, mentation intact and speech normal  PSYCH: mentation appears normal, affect normal/bright        ASSESSMENT / PLAN:   (Z00.00) Encounter for routine adult health examination without abnormal findings  (primary encounter diagnosis)  Comment: Discussed healthy lifestyle and preventative cares.    Plan:     (I10) Essential hypertension  Comment: controlled and check labs  Plan: Basic metabolic panel, atenolol (TENORMIN) 50         MG tablet            (Z13.6) CARDIOVASCULAR SCREENING; LDL GOAL LESS THAN 130  Comment: check lab  Plan: Lipid panel reflex to direct LDL Fasting            (R73.9) Hyperglycemia  Comment: will follow up blood sugar from this summer  Plan: Basic metabolic panel, Hemoglobin A1c            (R39.15) Urgency of urination  Comment: refilled med  Plan: tolterodine (DETROL LA) 4 MG 24 hr capsule            (N39.41) Urge incontinence  Comment:   Plan: tolterodine (DETROL LA) 4 MG 24 hr capsule            (L82.1) Seborrheic keratosis  Comment: benign  Plan:     (I10) Essential hypertension with goal blood pressure less than 140/90  Comment:   Plan: hydrochlorothiazide (HYDRODIURIL) 25 MG tablet            (Z12.31) Encounter for screening mammogram for breast cancer  Comment:   Plan: MA Screen Bilateral w/Timi, CANCELED: *MA         Screening Digital Bilateral     "          End of Life Planning:  Patient currently has an advanced directive: No.  I have verified the patient's ablity to prepare an advanced directive/make health care decisions.  Literature was provided to assist patient in preparing an advanced directive.    COUNSELING:  Reviewed preventive health counseling, as reflected in patient instructions       Regular exercise       Healthy diet/nutrition       Vision screening       Hearing screening       Dental care    BP Readings from Last 1 Encounters:   09/25/18 138/66     Estimated body mass index is 31.32 kg/(m^2) as calculated from the following:    Height as of this encounter: 5' 2.5\" (1.588 m).    Weight as of this encounter: 174 lb (78.9 kg).      Weight management plan: diet and activity     reports that she has never smoked. She has never used smokeless tobacco.      Appropriate preventive services were discussed with this patient, including applicable screening as appropriate for cardiovascular disease, diabetes, osteopenia/osteoporosis, and glaucoma.  As appropriate for age/gender, discussed screening for colorectal cancer, prostate cancer, breast cancer, and cervical cancer. Checklist reviewing preventive services available has been given to the patient.    Reviewed patients plan of care and provided an AVS. The Basic Care Plan (routine screening as documented in Health Maintenance) for Precious meets the Care Plan requirement. This Care Plan has been established and reviewed with the Patient.    Counseling Resources:  ATP IV Guidelines  Pooled Cohorts Equation Calculator  Breast Cancer Risk Calculator  FRAX Risk Assessment  ICSI Preventive Guidelines  Dietary Guidelines for Americans, 2010  USDA's MyPlate  ASA Prophylaxis  Lung CA Screening    Chau Cox MD  [facility]  "

## 2018-09-27 ENCOUNTER — TELEPHONE (OUTPATIENT)
Dept: FAMILY MEDICINE | Facility: CLINIC | Age: 68
End: 2018-09-27

## 2018-09-27 NOTE — PATIENT INSTRUCTIONS
Tetanus Toxoid Adsorbed injection  What is this medicine?  TETANUS TOXOID (TET n uhs tok soid) is a vaccine. It is used to prevent infections of tetanus (froilan).  How should I use this medicine?  This vaccine is for injection into a muscle. It is given by a health care professional.  A copy of Vaccine Information Statements will be given before each vaccination. Read this sheet carefully each time. The sheet may change frequently.  Talk to your pediatrician regarding the use of this medicine in children. While this drug may be prescribed for children as young as 7 years of age for selected conditions, precautions do apply.  What side effects may I notice from receiving this medicine?  Side effects that you should report to your doctor or health care professional as soon as possible:    allergic reactions like skin rash, itching or hives, swelling of the face, lips, or tongue    arthritis pain    breathing problems    extreme changes in behavior    fast, irregular heartbeat    fever over 100 degrees F    pain, tingling, numbness in the hands or feet    seizures    unusually weak or tired  Side effects that usually do not require medical attention (report to your doctor or health care professional if they continue or are bothersome):    aches or pains    bruising, pain, swelling at site where injected    low-grade fever of 100 degrees F or less    nausea  What may interact with this medicine?    adalimumab    anakinra    infliximab    medicines that suppress your immune system    medicines to treat cancer    steroid medicines like prednisone or cortisone    What if I miss a dose?  Keep appointments for follow-up (booster) doses as directed. It is important not to miss your dose. Call your doctor or health care professional if you are unable to keep an appointment.  Where should I keep my medicine?  This drug is given in a hospital or clinic and will not be stored at home.  What should I tell my health care  provider before I take this medicine?  They need to know if you have any of these conditions:    bleeding disorder    immune system problems    infection with fever    low levels of platelets in the blood    an unusual or allergic reaction to tetanus toxoid, vaccines, latex, thimerosal, aluminium, other medicines, foods, dyes, or preservatives    pregnant or trying to get pregnant    breast-feeding  What should I watch for while using this medicine?  Report any adverse reaction following administration to your health care provider. Contact your doctor or health care professional and seek emergency medical care if any serious side effects occur.  This vaccine, like all vaccines, may not fully protect everyone.  NOTE:This sheet is a summary. It may not cover all possible information. If you have questions about this medicine, talk to your doctor, pharmacist, or health care provider. Copyright  2018 [employer] love and you        Understanding Post-Injection Inflammation  After an injection, swelling and irritation (inflammation) can occur at the site where the needle entered the skin. This is a reaction to the needle or to the medicine that was injected. Or it may be a reaction to both. The reaction may happen right away. Or it may only begin hours after the injection. In most cases, the reaction is not serious and goes away on its own.  What causes post-injection inflammation?  The most common cause is the skin s response to the needle or the medicine. Less common causes include an allergic reaction to the medicine. Or you may have an infection at the injection site.  Symptoms of post-injection inflammation  Symptoms at the injection site may include:    Swelling    Itching    Pain    Redness    Warmth    Drainage at the injection site    Rash  Treatment for post-injection inflammation  Treatment depends on the cause and how bad the reaction is. Most post-injection inflammation is mild. It goes away on its own in hours to days. If  treatment is needed, it may include:    Cold packs. These help reduce swelling, itching, and pain.    Over-the-counter pain medicines. These help reduce pain and inflammation.    Prescription medicine. These treat infection.  Possible complications of post-injection inflammation  Be alert for a reaction all over your body. This may cause symptoms such as a skin rash, severe itching, or raised fluid-filled bumps called hives. This kind of reaction can be serious, especially if it affects breathing. If you or your child develops symptoms away from the injection site, call your healthcare provider for further instructions.  When to call your healthcare provider  Call your healthcare provider right away if you have any of these:    Fever of 100.4 F (38 C) or higher, or as directed    Severe pain at the injection site    Blistering at the injection site    Muscle aches    Upset stomach (nausea), headache, or dizziness    Skin rash, severe itching, or hives    Swelling of the lips, tongue, or throat    Symptoms that get worse instead of better   Date Last Reviewed: 5/1/2016 2000-2017 [employer]. [address] [place] [address], [place] [address]. All rights reserved. This information is not intended as a substitute for professional medical care. Always follow your healthcare professional's instructions.

## 2018-09-27 NOTE — TELEPHONE ENCOUNTER
Reason for call:  Patient reporting a symptom    Symptom or request: Follow-up to injection Tuesday Sept 25th, red, swollen and hot to touch.    Duration (how long have symptoms been present): 2 days    Have you been treated for this before? No    Additional comments:   See above    Phone Number patient can be reached at:  Cell number on file:    Telephone Information:   Mobile [phone]       Best Time:  any    Can we leave a detailed message on this number:  YES    Call taken on 9/27/2018 at 10:06 AM by Daina Phillips

## 2018-09-27 NOTE — TELEPHONE ENCOUNTER
S-(situation): post injection inflammation.  Pt says it is the same as yesterday.  Looks like a giant mosquito bite.  It is hot and itchy.  Denies fever.  Denies pain.  Denies breathing or swallowing changes.    B-(background): tetanus shot two days ago.    A-(assessment): Post injection inflammation without signs of complications.    R-(recommendations): Advised home care management per Pt Instructions.  Advised to be seen tomorrow if not improving.    Pt agrees with recommendations.

## 2019-01-07 ENCOUNTER — TELEPHONE (OUTPATIENT)
Dept: FAMILY MEDICINE | Facility: CLINIC | Age: 69
End: 2019-01-07

## 2019-01-07 ENCOUNTER — TELEPHONE (OUTPATIENT)
Dept: OBGYN | Facility: CLINIC | Age: 69
End: 2019-01-07

## 2019-01-07 DIAGNOSIS — N95.2 ATROPHIC VAGINITIS: ICD-10-CM

## 2019-01-07 DIAGNOSIS — I10 ESSENTIAL HYPERTENSION WITH GOAL BLOOD PRESSURE LESS THAN 140/90: ICD-10-CM

## 2019-01-07 DIAGNOSIS — R39.15 URGENCY OF URINATION: ICD-10-CM

## 2019-01-07 DIAGNOSIS — I10 ESSENTIAL HYPERTENSION: ICD-10-CM

## 2019-01-07 DIAGNOSIS — N39.41 URGE INCONTINENCE: ICD-10-CM

## 2019-01-07 RX ORDER — TOLTERODINE 4 MG/1
4 CAPSULE, EXTENDED RELEASE ORAL DAILY
Qty: 90 CAPSULE | Refills: 2 | Status: SHIPPED | OUTPATIENT
Start: 2019-01-07 | End: 2019-05-17

## 2019-01-07 RX ORDER — HYDROCHLOROTHIAZIDE 25 MG/1
25 TABLET ORAL DAILY
Qty: 90 TABLET | Refills: 2 | Status: SHIPPED | OUTPATIENT
Start: 2019-01-07 | End: 2019-08-30

## 2019-01-07 RX ORDER — ATENOLOL 50 MG/1
75 TABLET ORAL DAILY
Qty: 135 TABLET | Refills: 2 | Status: SHIPPED | OUTPATIENT
Start: 2019-01-07 | End: 2019-08-30

## 2019-01-07 RX ORDER — ESTRADIOL 0.5 MG/1
TABLET ORAL
Qty: 24 TABLET | Refills: 3 | Status: SHIPPED | OUTPATIENT
Start: 2019-01-07 | End: 2019-08-30

## 2019-01-07 NOTE — TELEPHONE ENCOUNTER
Last office visit 8/19/18  Prescription approved per [facility] Refill Protocol.    Marysol Sanon   [facility]  RN

## 2019-01-07 NOTE — TELEPHONE ENCOUNTER
Reason for Call:  Other prescription    Detailed comments: pt says she asked the mail order to call [facility] and transfer Rx but they said they need something from the Dr stating what medications she is taking.     Phone Number Patient can be reached at: Other phone number:  [facility] fax number- [phone]    Best Time: PT number [phone]    Can we leave a detailed message on this number? Not Applicable    Call taken on 1/7/2019 at 9:33 AM by Jodie Ozuna

## 2019-01-07 NOTE — TELEPHONE ENCOUNTER
Patient needs estradiol 0.5 mg [facility]. Pended.      Norma LEBRON BSN, RN

## 2019-05-16 NOTE — H&P
[facility]  [facility]  [address]  [facility] [place] [address]  [phone]  Dept: [phone]    OPHTHALMOLOGY PRE-OPERATIVE  HISTORY AND PHYSICAL    DATE OF H/P:  2019    DATE OF SURGERY:  May 22, 2019  PROCEDURE:  Procedure(s):  Cataract Removal  with Implant, Right Eye  LENS IMPLANT:  [id number] +24.0  REFRACTIVE GOAL:  PL Sph  SURGEON:  Basil Ruiz MD    ANESTHESIA:  TOPICAL / MAC    OR CASE REQUIREMENTS:  S/p LASIK.    DEMOGRAPHICS:  Demographic Information on Precious Reyes:    Precious Reyes  Gender: female  : 1950  [id number] [facility] [id number] [phone] (home)     Medical Record: [id number]  Social Security Number: [id number]  Pharmacy:    [facility] [address] [place], [place] - [address] - [address]  Primary Care Provider: Chau Cox    Parent's names are: Data Unavailable (mother) and Data Unavailable (father).    Insurance: Payor: COMMERCIAL / Plan: 37coinsT MEDICARE ADVANTAGE / Product Type: HMO /     OCULAR HISTORY:  Cataracts, each eye.  Myopia, s/p LASIK each eye.    HISTORIES:  Past Medical History:   Diagnosis Date     Cholecystitis, unspecified      Malignant melanoma (H)      Other malignant neoplasm of skin, site unspecified ??????    Non-melanoma skin cancer, Pt is checking to learn what  kind of cancer     Unspecified essential hypertension     atenolol     URINARY INCONTINENCE 5/3/2005    detrol      Urinary tract infection, site not specified     Recurrent UTI's       Past Surgical History:   Procedure Laterality Date     BIOPSY BREAST       COLONOSCOPY  age 49    repeat 10 years, done at outside hospital     COLPORRHAPHY POSTERIOR N/A 2018    Procedure: COLPORRHAPHY POSTERIOR;  Posterior Vaginal Repair;  Surgeon: Dora Eagle MD;  Location: [facility] OR     HC EXCISION BREAST LESION, OPEN >=1      5 biopsies all neg     HC REMOVAL  "GALLBLADDER  9 05    Cholecystectomy     HYSTERECTOMY, PAP NO LONGER INDICATED  2004    TVH-BSO, Lynx Pubovaginal sling       Family History   Problem Relation Age of Onset     Heart Disease Mother          78     Alcohol/Drug Mother         alcholism     Obesity Mother      Heart Disease Father         valve is \"leaky\"     Allergies Brother      Arthritis Brother      Allergies Sister      Thyroid Disease Sister      Arthritis Sister         Lupus     Neurologic Disorder Sister         Bipolar     Heart Disease Sister         \"leaky\" valve     Diabetes Son      Allergies Sister      Thyroid Disease Sister      Thyroid Disease Sister      Thyroid Disease Sister      Heart Disease Sister         valve \"leaky\"     Cancer - colorectal No family hx of      Breast Cancer No family hx of      Endometrial Cancer No family hx of      Colon Cancer No family hx of      Ovarian Cancer No family hx of        Social History     Tobacco Use     Smoking status: Never Smoker     Smokeless tobacco: Never Used   Substance Use Topics     Alcohol use: Yes     Comment: 0-2 drinks per week       MEDICATIONS:  No current facility-administered medications for this encounter.      Current Outpatient Medications   Medication Sig     ASPIRIN 81 MG OR TABS ONE DAILY     atenolol (TENORMIN) 50 MG tablet Take 1.5 tablets (75 mg) by mouth daily Hold on file until needed     cetirizine (ZYRTEC) 10 MG tablet Take 10 mg by mouth daily     estradiol (ESTRACE) 0.5 MG tablet 1 tab vaginally at bedtime twice a week     hydrochlorothiazide (HYDRODIURIL) 25 MG tablet Take 1 tablet (25 mg) by mouth daily Hold on file until needed     IBUPROFEN 200 MG OR TABS TAKE 1 TO 2 TABLETS EVERY 4 TO 6 HOURS AS NEEDED WITH FOOD     ORDER FOR DME needs have cpap machine and mask, last one 10 years old, not fitting right     tolterodine ER (DETROL LA) 4 MG 24 hr capsule Take 1 capsule (4 mg) by mouth daily Hold on file until needed     VITAMIN D PO Take 1,000 " Units by mouth daily        ALLERGIES:     Allergies   Allergen Reactions     Adhesive Tape Rash     Pt states it is Latex, not tape.     Latex Rash     Sulfa Drugs Rash       PERTINENT SYSTEMS REVIEW:    1. No - Do you have a history of heart attack, stroke, stent, bypass or surgery on an artery in the head, neck, heart or legs?  2. No - Do you ever have any pain or discomfort in your chest?  3. No - Do you have a history of  Heart Failure?  4. No - Are you troubled by shortness of breath when walking: On the level, up a slight hill or at night?  5. No - Do you currently have a cold, bronchitis or other respiratory infection?  6. No - Do you have a cough, shortness of breath or wheezing?  7. No - Do you sometimes get pains in the calves of your legs when you walk?  8. No - Do you or anyone in your family have previous history of blood clots?  9. No - Do you or does anyone in your family have a serious bleeding problem such as prolonged bleeding following surgeries or cuts?  10. No - Have you ever had problems with anemia or been told to take iron pills?  11. No - Have you had any abnormal blood loss such as black, tarry or bloody stools, or abnormal vaginal bleeding?  12. No - Have you ever had a blood transfusion?  13. No - Have you or any of your relatives ever had problems with anesthesia?  14. Yes: Sleep apnea - Do you have sleep apnea, excessive snoring or daytime drowsiness?  15. No - Do you have any prosthetic heart valves?  16. No - Do you have prosthetic joints?    EXAMINATION:  Vitals were reviewed                     Vison:  Va, right - 20/250, left - 20/40;   BAT, left - 20/80;  HEENT:  Cataract, otherwise unremarkable.  LUNGS:  Clear  CV:  Regular rate and rhythm without murmur  ABD:  Soft and nontender  NEURO:  Alert and nonfocal    IMPRESSION:  Patient cleared for ophthalmic surgery.  Low risk with monitored, light sedation.  I have assessed the patient's DVT risk, and no additional orders  necessary.    PLAN:  Procedure(s):  Cataract Removal  with Implant, Right Eye      Basil Ruiz MD

## 2019-05-20 ENCOUNTER — ANESTHESIA EVENT (OUTPATIENT)
Dept: SURGERY | Facility: CLINIC | Age: 69
End: 2019-05-20
Payer: COMMERCIAL

## 2019-05-20 NOTE — ANESTHESIA PREPROCEDURE EVALUATION
Anesthesia Pre-Procedure Evaluation    Patient: Precious Reyes   MRN: [id number] : 1950          Preoperative Diagnosis: cataract    Procedure(s):  Cataract Removal  with Implant    Past Medical History:   Diagnosis Date     Cholecystitis, unspecified      Malignant melanoma (H)      Other malignant neoplasm of skin, site unspecified ??????    Non-melanoma skin cancer, Pt is checking to learn what  kind of cancer     Unspecified essential hypertension     atenolol     URINARY INCONTINENCE 5/3/2005    detrol      Urinary tract infection, site not specified     Recurrent UTI's     Past Surgical History:   Procedure Laterality Date     BIOPSY BREAST       COLONOSCOPY  age 49    repeat 10 years, done at outside hospital     COLPORRHAPHY POSTERIOR N/A 2018    Procedure: COLPORRHAPHY POSTERIOR;  Posterior Vaginal Repair;  Surgeon: Dora Eagle MD;  Location: [facility] OR     HC EXCISION BREAST LESION, OPEN >=1      5 biopsies all neg     HC REMOVAL GALLBLADDER      Cholecystectomy     HYSTERECTOMY, PAP NO LONGER INDICATED  2004    TVH-BSO, Lynx Pubovaginal sling       Anesthesia Evaluation     .             ROS/MED HX    ENT/Pulmonary: Comment: Disease of jaw      Neurologic:       Cardiovascular:     (+) Dyslipidemia, hypertension----. : . . . :. . Previous cardiac testing date:results:date: results:ECG reviewed date:18 results:Sinus Bradycardia   -Prominent R(V1) and left axis -nonspecific -Seen with pulmonary disease -possible anterior fascicular block.     ABNORMAL    date: results:          METS/Exercise Tolerance:     Hematologic:         Musculoskeletal:   (+) arthritis,  other musculoskeletal- disorder of bone an dcartilage      GI/Hepatic:         Renal/Genitourinary: Comment: Hx of UTI's, urinary incontinence        Endo:         Psychiatric:         Infectious Disease:         Malignancy:   (+) Malignancy History of Skin          Other: Comment: AK  Right cataract     "                     Physical Exam  Normal systems: cardiovascular, pulmonary and dental    Airway   Mallampati: II  TM distance: >3 FB  Neck ROM: full    Dental     Cardiovascular       Pulmonary             Lab Results   Component Value Date    WBC 7.3 09/30/2014    HGB 14.8 09/30/2014    HCT 42.3 09/30/2014     09/30/2014     09/25/2018    POTASSIUM 3.1 (L) 09/25/2018    CHLORIDE 103 09/25/2018    CO2 31 09/25/2018    BUN 17 09/25/2018    CR 0.84 09/25/2018    GLC 94 09/25/2018    KARIME 8.1 (L) 09/25/2018    ALBUMIN 3.8 09/30/2014    PROTTOTAL 7.3 09/30/2014    ALT 49 09/30/2014    AST 26 09/30/2014    ALKPHOS 82 09/30/2014    BILITOTAL 0.5 09/30/2014    LIPASE 97 09/30/2014    AMYLASE 41 09/12/2005    INR 0.98 09/12/2005    TSH 1.15 08/21/2007    T4 0.85 08/21/2007       Preop Vitals  BP Readings from Last 3 Encounters:   09/25/18 138/66   08/29/18 134/79   08/01/18 122/68    Pulse Readings from Last 3 Encounters:   09/25/18 56   08/29/18 52   08/01/18 59      Resp Readings from Last 3 Encounters:   09/25/18 10   08/29/18 18   08/01/18 16    SpO2 Readings from Last 3 Encounters:   09/25/18 98%   07/17/18 99%   09/12/17 96%      Temp Readings from Last 1 Encounters:   09/25/18 36.3  C (97.4  F) (Tympanic)    Ht Readings from Last 1 Encounters:   09/25/18 1.588 m (5' 2.5\")      Wt Readings from Last 1 Encounters:   09/25/18 78.9 kg (174 lb)    Estimated body mass index is 31.32 kg/m  as calculated from the following:    Height as of 9/25/18: 1.588 m (5' 2.5\").    Weight as of 9/25/18: 78.9 kg (174 lb).       Anesthesia Plan      History & Physical Review  History and physical reviewed and following examination; no interval change.    ASA Status:  3 .    NPO Status:  > 6 hours    Plan for MAC Reason for MAC:  Deep or markedly invasive procedure (G8)         Postoperative Care      Consents  Anesthetic plan, risks, benefits and alternatives discussed with:  Patient..                 Brooklyn Neal, APRN " CRNA

## 2019-05-22 ENCOUNTER — HOSPITAL ENCOUNTER (OUTPATIENT)
Facility: CLINIC | Age: 69
Discharge: HOME | End: 2019-05-22
Attending: OPHTHALMOLOGY | Admitting: OPHTHALMOLOGY
Payer: COMMERCIAL

## 2019-05-22 ENCOUNTER — ANESTHESIA (OUTPATIENT)
Dept: SURGERY | Facility: CLINIC | Age: 69
End: 2019-05-22
Payer: COMMERCIAL

## 2019-05-22 VITALS
RESPIRATION RATE: 12 BRPM | TEMPERATURE: 97.8 F | BODY MASS INDEX: 30.83 KG/M2 | WEIGHT: 174 LBS | SYSTOLIC BLOOD PRESSURE: 143 MMHG | OXYGEN SATURATION: 98 % | DIASTOLIC BLOOD PRESSURE: 70 MMHG | HEIGHT: 63 IN

## 2019-05-22 PROCEDURE — 37000012 ZZH ANESTHESIA CATARACT PACKAGE: Performed by: OPHTHALMOLOGY

## 2019-05-22 PROCEDURE — V2632 POST CHMBR INTRAOCULAR LENS: HCPCS | Performed by: OPHTHALMOLOGY

## 2019-05-22 PROCEDURE — 25000128 H RX IP 250 OP 636: Performed by: NURSE ANESTHETIST, CERTIFIED REGISTERED

## 2019-05-22 PROCEDURE — 25000125 ZZHC RX 250: Performed by: OPHTHALMOLOGY

## 2019-05-22 PROCEDURE — 25800030 ZZH RX IP 258 OP 636: Performed by: NURSE ANESTHETIST, CERTIFIED REGISTERED

## 2019-05-22 PROCEDURE — 25000125 ZZHC RX 250: Performed by: NURSE ANESTHETIST, CERTIFIED REGISTERED

## 2019-05-22 PROCEDURE — 36000025 ZZH CATARACT SURGICAL PACKAGE: Performed by: OPHTHALMOLOGY

## 2019-05-22 PROCEDURE — 71000022 ZZH RECOVERY CATRACT PACKAGE: Performed by: OPHTHALMOLOGY

## 2019-05-22 PROCEDURE — 25000128 H RX IP 250 OP 636: Performed by: OPHTHALMOLOGY

## 2019-05-22 DEVICE — EYE IMP IOL AMO PCL TECNIS ZCB00 24.0: Type: IMPLANTABLE DEVICE | Site: EYE | Status: FUNCTIONAL

## 2019-05-22 RX ORDER — SODIUM CHLORIDE, SODIUM LACTATE, POTASSIUM CHLORIDE, CALCIUM CHLORIDE 600; 310; 30; 20 MG/100ML; MG/100ML; MG/100ML; MG/100ML
INJECTION, SOLUTION INTRAVENOUS CONTINUOUS
Status: DISCONTINUED | OUTPATIENT
Start: 2019-05-22 | End: 2019-05-22 | Stop reason: HOSPADM

## 2019-05-22 RX ORDER — TROPICAMIDE 10 MG/ML
1 SOLUTION/ DROPS OPHTHALMIC
Status: COMPLETED | OUTPATIENT
Start: 2019-05-22 | End: 2019-05-22

## 2019-05-22 RX ORDER — PHENYLEPHRINE HYDROCHLORIDE 25 MG/ML
1 SOLUTION/ DROPS OPHTHALMIC
Status: COMPLETED | OUTPATIENT
Start: 2019-05-22 | End: 2019-05-22

## 2019-05-22 RX ORDER — BALANCED SALT SOLUTION 6.4; .75; .48; .3; 3.9; 1.7 MG/ML; MG/ML; MG/ML; MG/ML; MG/ML; MG/ML
SOLUTION OPHTHALMIC PRN
Status: DISCONTINUED | OUTPATIENT
Start: 2019-05-22 | End: 2019-05-22 | Stop reason: HOSPADM

## 2019-05-22 RX ORDER — CYCLOPENTOLATE HYDROCHLORIDE 10 MG/ML
1 SOLUTION/ DROPS OPHTHALMIC
Status: COMPLETED | OUTPATIENT
Start: 2019-05-22 | End: 2019-05-22

## 2019-05-22 RX ORDER — LIDOCAINE HYDROCHLORIDE 20 MG/ML
JELLY TOPICAL PRN
Status: DISCONTINUED | OUTPATIENT
Start: 2019-05-22 | End: 2019-05-22 | Stop reason: HOSPADM

## 2019-05-22 RX ORDER — LIDOCAINE 40 MG/G
CREAM TOPICAL
Status: DISCONTINUED | OUTPATIENT
Start: 2019-05-22 | End: 2019-05-22 | Stop reason: HOSPADM

## 2019-05-22 RX ORDER — PROPARACAINE HYDROCHLORIDE 5 MG/ML
SOLUTION/ DROPS OPHTHALMIC PRN
Status: DISCONTINUED | OUTPATIENT
Start: 2019-05-22 | End: 2019-05-22 | Stop reason: HOSPADM

## 2019-05-22 RX ADMIN — TROPICAMIDE 1 DROP: 10 SOLUTION/ DROPS OPHTHALMIC at 06:41

## 2019-05-22 RX ADMIN — SODIUM CHLORIDE, POTASSIUM CHLORIDE, SODIUM LACTATE AND CALCIUM CHLORIDE 1000 ML: 600; 310; 30; 20 INJECTION, SOLUTION INTRAVENOUS at 06:27

## 2019-05-22 RX ADMIN — LIDOCAINE HYDROCHLORIDE 1 ML: 10 INJECTION, SOLUTION EPIDURAL; INFILTRATION; INTRACAUDAL; PERINEURAL at 06:27

## 2019-05-22 RX ADMIN — MIDAZOLAM 2 MG: 1 INJECTION INTRAMUSCULAR; INTRAVENOUS at 07:39

## 2019-05-22 RX ADMIN — PHENYLEPHRINE HYDROCHLORIDE 1 DROP: 25 SOLUTION/ DROPS OPHTHALMIC at 06:32

## 2019-05-22 RX ADMIN — PHENYLEPHRINE HYDROCHLORIDE 1 DROP: 25 SOLUTION/ DROPS OPHTHALMIC at 06:24

## 2019-05-22 RX ADMIN — PHENYLEPHRINE HYDROCHLORIDE 1 DROP: 25 SOLUTION/ DROPS OPHTHALMIC at 06:39

## 2019-05-22 RX ADMIN — CYCLOPENTOLATE HYDROCHLORIDE 1 DROP: 10 SOLUTION/ DROPS OPHTHALMIC at 06:41

## 2019-05-22 RX ADMIN — TROPICAMIDE 1 DROP: 10 SOLUTION/ DROPS OPHTHALMIC at 06:33

## 2019-05-22 RX ADMIN — TROPICAMIDE 1 DROP: 10 SOLUTION/ DROPS OPHTHALMIC at 06:26

## 2019-05-22 RX ADMIN — CYCLOPENTOLATE HYDROCHLORIDE 1 DROP: 10 SOLUTION/ DROPS OPHTHALMIC at 06:25

## 2019-05-22 RX ADMIN — CYCLOPENTOLATE HYDROCHLORIDE 1 DROP: 10 SOLUTION/ DROPS OPHTHALMIC at 06:33

## 2019-05-22 NOTE — ANESTHESIA POSTPROCEDURE EVALUATION
Patient: Precious Reyes    Procedure(s):  Cataract Removal  with Implant    Diagnosis:cataract  Diagnosis Additional Information: No value filed.    Anesthesia Type:  MAC    Note:  Anesthesia Post Evaluation    Patient location during evaluation: Bedside  Patient participation: Able to fully participate in evaluation  Level of consciousness: awake and alert  Pain management: adequate  Airway patency: patent  Cardiovascular status: acceptable  Respiratory status: acceptable  Hydration status: acceptable  PONV: none     Anesthetic complications: None          Last vitals:  Vitals:    05/22/19 0610   BP: 132/51   Resp: 16   Temp: 36.6  C (97.8  F)   SpO2: 97%         Electronically Signed By: Dayne Hendrix CRNA, APRN CRNA  May 22, 2019  8:00 AM

## 2019-05-22 NOTE — ANESTHESIA CARE TRANSFER NOTE
Patient: Precious Reyes    Procedure(s):  Cataract Removal  with Implant    Diagnosis: cataract  Diagnosis Additional Information: No value filed.    Anesthesia Type:   MAC     Note:    Patient transferred to:Phase II  Handoff Report: Identifed the Patient, Identified the Reponsible Provider, Reviewed the pertinent medical history, Discussed the surgical course, Reviewed Intra-OP anesthesia mangement and issues during anesthesia, Set expectations for post-procedure period and Allowed opportunity for questions and acknowledgement of understanding      Vitals: (Last set prior to Anesthesia Care Transfer)    CRNA VITALS  5/22/2019 0730 - 5/22/2019 0800      5/22/2019             Pulse:  59    SpO2:  92 %                Electronically Signed By: Dayne Hendrix CRNA, APRN CRNA  May 22, 2019  8:00 AM

## 2019-05-22 NOTE — OP NOTE
OPHTHALMOLOGY OPERATIVE NOTE    PATIENT: Precious Reyes  DATE OF SURGERY: 5/22/2019  PREOPERATIVE DIAGNOSIS:  Senile Nuclear Cataract, Right eye  POSTOPERATIVE DIAGNOSIS:  Senile Nuclear Cataract, Right eye  OPERATIVE PROCEDURE:  Phacoemulsification with placement of intraocular lens  SURGEON:  Basil Ruiz MD  ANESTHESIA:  Topical / MAC  EBL:  None  SPECIMENS:  None  COMPLICATIONS:  None    PROCEDURE:  The patient was brought to the operating room at [facility].  The right eye was prepped and draped in the usual fashion for cataract surgery.  A wire lid speculum was inserted.  A super sharp blade was used to make a paracentesis at the 11 O'clock position.  The super sharp blade was used to make a partial thickness temporal groove, which was 3 mm in length.  0.8 mL of non-preserved epi-Shugarcaine was injected into the anterior chamber.  Viscoelastic was used to inflate the anterior chamber through a cannula.  A 2.5 mm microkeratome was used to make a temporal clear corneal incision in a two-plane fashion.  A cystotome needle and forceps were used to make a capsulorrhexis.  Hydrodissection and hydrodelineation were performed with Balance Salt Solution.  The lens was then phacoemulsified and removed without complications.  The cortical material was removed with bimanual irrigation and aspiration.  The capsular bag was filled with viscoelastic.  A posterior chamber intraocular lens, preselected and recorded, was folded and inserted into the capsular bag.  The viscoelastic was removed with the irrigation and aspiration tip.  Balanced Salt Solution with Vigamox, 150mg/0.1mL, was used to refill the anterior chamber.  The wounds were checked for water tightness and required no suture.  The wire lid speculum was removed.  The patient's right eye was cleaned and a drop of each post-operative drop was placed, followed by a gibson shield.  The patient tolerated the procedure well, and there were  no complications.      Basil Ruiz MD

## 2019-05-28 ENCOUNTER — HOSPITAL ENCOUNTER (OUTPATIENT)
Dept: MAMMOGRAPHY | Facility: CLINIC | Age: 69
Discharge: HOME | End: 2019-05-28
Attending: FAMILY MEDICINE | Admitting: FAMILY MEDICINE
Payer: COMMERCIAL

## 2019-05-28 DIAGNOSIS — Z12.31 ENCOUNTER FOR SCREENING MAMMOGRAM FOR BREAST CANCER: ICD-10-CM

## 2019-05-28 PROCEDURE — 77063 BREAST TOMOSYNTHESIS BI: CPT

## 2019-07-03 NOTE — H&P
[facility]  [facility]  [address]  [facility] [address]  [phone]  Dept: [phone]    OPHTHALMOLOGY PRE-OPERATIVE  HISTORY AND PHYSICAL    DATE OF H/P:  2019    DATE OF SURGERY:  2019  PROCEDURE:  Procedure(s):  Cataract Removal with Implant, Left Eye  LENS IMPLANT:  [id number] +24.0  REFRACTIVE GOAL:  PL Sph  SURGEON:  Basil Ruiz MD    ANESTHESIA:  TOPICAL / MAC    OR CASE REQUIREMENTS:  S/p LASIK each eye.    DEMOGRAPHICS:  Demographic Information on Precious Reyes:    Precious Reyes  Gender: female  : 1950  [address] [facility] [id number] [phone] (home)     Medical Record: [id number]  Social Security Number: [id number]  Pharmacy:    [facility] [address] [place], [place] - [address] - [address]  Primary Care Provider: Chau Cox    Parent's names are: Data Unavailable (mother) and Data Unavailable (father).    Insurance: Payor: COMMERCIAL / Plan: [employer] MEDICARE ADVANTAGE / Product Type: HMO /     OCULAR HISTORY:  Cataracts, s/p IOL right eye.  Myopia, s/p LASIK each eye.    HISTORIES:  Past Medical History:   Diagnosis Date     Cholecystitis, unspecified      Malignant melanoma (H)      Other malignant neoplasm of skin, site unspecified ??????    Non-melanoma skin cancer, Pt is checking to learn what  kind of cancer     Unspecified essential hypertension     atenolol     URINARY INCONTINENCE 5/3/2005    detrol      Urinary tract infection, site not specified     Recurrent UTI's       Past Surgical History:   Procedure Laterality Date     BIOPSY BREAST       COLONOSCOPY  age 49    repeat 10 years, done at outside hospital     COLPORRHAPHY POSTERIOR N/A 2018    Procedure: COLPORRHAPHY POSTERIOR;  Posterior Vaginal Repair;  Surgeon: Dora Eagle MD;  Location: [facility] OR      EXCISION BREAST LESION, OPEN >=1      5 biopsies all neg     HC  "REMOVAL GALLBLADDER  9 05    Cholecystectomy     HYSTERECTOMY, PAP NO LONGER INDICATED  2004    TVH-BSO, Lynx Pubovaginal sling     PHACOEMULSIFICATION WITH STANDARD INTRAOCULAR LENS IMPLANT Right 2019    Procedure: Cataract Removal  with Implant;  Surgeon: Basil Ruiz MD;  Location: [facility] OR       Family History   Problem Relation Age of Onset     Heart Disease Mother          78     Alcohol/Drug Mother         alcholism     Obesity Mother      Heart Disease Father         valve is \"leaky\"     Allergies Brother      Arthritis Brother      Allergies Sister      Thyroid Disease Sister      Arthritis Sister         Lupus     Neurologic Disorder Sister         Bipolar     Heart Disease Sister         \"leaky\" valve     Diabetes Son      Allergies Sister      Thyroid Disease Sister      Thyroid Disease Sister      Thyroid Disease Sister      Heart Disease Sister         valve \"leaky\"     Cancer - colorectal No family hx of      Breast Cancer No family hx of      Endometrial Cancer No family hx of      Colon Cancer No family hx of      Ovarian Cancer No family hx of        Social History     Tobacco Use     Smoking status: Never Smoker     Smokeless tobacco: Never Used   Substance Use Topics     Alcohol use: Yes     Comment: 0-2 drinks per week       MEDICATIONS:  No current facility-administered medications for this encounter.      Current Outpatient Medications   Medication Sig     ASPIRIN 81 MG OR TABS ONE DAILY     atenolol (TENORMIN) 50 MG tablet Take 1.5 tablets (75 mg) by mouth daily Hold on file until needed     cetirizine (ZYRTEC) 10 MG tablet Take 10 mg by mouth daily     estradiol (ESTRACE) 0.5 MG tablet 1 tab vaginally at bedtime twice a week     hydrochlorothiazide (HYDRODIURIL) 25 MG tablet Take 1 tablet (25 mg) by mouth daily Hold on file until needed     ORDER FOR DME needs have cpap machine and mask, last one 10 years old, not fitting right     VITAMIN D PO Take 1,000 Units by mouth " daily        ALLERGIES:     Allergies   Allergen Reactions     Adhesive Tape Rash     Pt states it is Latex, not tape.     Latex Rash     Sulfa Drugs Rash       PERTINENT SYSTEMS REVIEW:    1. No - Do you have a history of heart attack, stroke, stent, bypass or surgery on an artery in the head, neck, heart or legs?  2. No - Do you ever have any pain or discomfort in your chest?  3. No - Do you have a history of  Heart Failure?  4. No - Are you troubled by shortness of breath when walking: On the level, up a slight hill or at night?  5. No - Do you currently have a cold, bronchitis or other respiratory infection?  6. No - Do you have a cough, shortness of breath or wheezing?  7. No - Do you sometimes get pains in the calves of your legs when you walk?  8. No - Do you or anyone in your family have previous history of blood clots?  9. No - Do you or does anyone in your family have a serious bleeding problem such as prolonged bleeding following surgeries or cuts?  10. No - Have you ever had problems with anemia or been told to take iron pills?  11. No - Have you had any abnormal blood loss such as black, tarry or bloody stools, or abnormal vaginal bleeding?  12. No - Have you ever had a blood transfusion?  13. No - Have you or any of your relatives ever had problems with anesthesia?  14. Yes: sleep apnea - Do you have sleep apnea, excessive snoring or daytime drowsiness?  15. No - Do you have any prosthetic heart valves?  16. No - Do you have prosthetic joints?    EXAMINATION:  Vitals were reviewed                     Vison:  Va, left - 20/40;   BAT, left - 20/80;  HEENT:  Cataract, otherwise unremarkable.  LUNGS:  Clear  CV:  Regular rate and rhythm without murmur  ABD:  Soft and nontender  NEURO:  Alert and nonfocal    IMPRESSION:  Patient cleared for ophthalmic surgery.  Low risk with monitored, light sedation.  I have assessed the patient's DVT risk, and no additional orders necessary.    PLAN:  Procedure(s):   Cataract Removal with Implant, Left Eye      Basil Ruiz MD

## 2019-07-05 ENCOUNTER — ANESTHESIA EVENT (OUTPATIENT)
Dept: SURGERY | Facility: CLINIC | Age: 69
End: 2019-07-05
Payer: COMMERCIAL

## 2019-07-08 ENCOUNTER — HOSPITAL ENCOUNTER (OUTPATIENT)
Facility: CLINIC | Age: 69
Discharge: HOME | End: 2019-07-08
Attending: OPHTHALMOLOGY | Admitting: OPHTHALMOLOGY
Payer: COMMERCIAL

## 2019-07-08 ENCOUNTER — ANESTHESIA (OUTPATIENT)
Dept: SURGERY | Facility: CLINIC | Age: 69
End: 2019-07-08
Payer: COMMERCIAL

## 2019-07-08 VITALS
HEIGHT: 62 IN | TEMPERATURE: 98.2 F | WEIGHT: 178 LBS | DIASTOLIC BLOOD PRESSURE: 69 MMHG | BODY MASS INDEX: 32.76 KG/M2 | RESPIRATION RATE: 16 BRPM | OXYGEN SATURATION: 99 % | SYSTOLIC BLOOD PRESSURE: 146 MMHG

## 2019-07-08 PROCEDURE — V2632 POST CHMBR INTRAOCULAR LENS: HCPCS | Performed by: OPHTHALMOLOGY

## 2019-07-08 PROCEDURE — 71000022 ZZH RECOVERY CATRACT PACKAGE: Performed by: OPHTHALMOLOGY

## 2019-07-08 PROCEDURE — 37000012 ZZH ANESTHESIA CATARACT PACKAGE: Performed by: OPHTHALMOLOGY

## 2019-07-08 PROCEDURE — 36000025 ZZH CATARACT SURGICAL PACKAGE: Performed by: OPHTHALMOLOGY

## 2019-07-08 PROCEDURE — 25000125 ZZHC RX 250: Performed by: OPHTHALMOLOGY

## 2019-07-08 PROCEDURE — 25800030 ZZH RX IP 258 OP 636: Performed by: NURSE ANESTHETIST, CERTIFIED REGISTERED

## 2019-07-08 PROCEDURE — 25000128 H RX IP 250 OP 636: Performed by: OPHTHALMOLOGY

## 2019-07-08 PROCEDURE — 25000128 H RX IP 250 OP 636: Performed by: NURSE ANESTHETIST, CERTIFIED REGISTERED

## 2019-07-08 PROCEDURE — 25000125 ZZHC RX 250: Performed by: NURSE ANESTHETIST, CERTIFIED REGISTERED

## 2019-07-08 DEVICE — EYE IMP IOL AMO PCL TECNIS ZCB00 24.0: Type: IMPLANTABLE DEVICE | Site: EYE | Status: FUNCTIONAL

## 2019-07-08 RX ORDER — LIDOCAINE HYDROCHLORIDE 20 MG/ML
JELLY TOPICAL PRN
Status: DISCONTINUED | OUTPATIENT
Start: 2019-07-08 | End: 2019-07-08 | Stop reason: HOSPADM

## 2019-07-08 RX ORDER — SODIUM CHLORIDE, SODIUM LACTATE, POTASSIUM CHLORIDE, CALCIUM CHLORIDE 600; 310; 30; 20 MG/100ML; MG/100ML; MG/100ML; MG/100ML
INJECTION, SOLUTION INTRAVENOUS CONTINUOUS
Status: DISCONTINUED | OUTPATIENT
Start: 2019-07-08 | End: 2019-07-08 | Stop reason: HOSPADM

## 2019-07-08 RX ORDER — SODIUM CHLORIDE, SODIUM LACTATE, POTASSIUM CHLORIDE, CALCIUM CHLORIDE 600; 310; 30; 20 MG/100ML; MG/100ML; MG/100ML; MG/100ML
INJECTION, SOLUTION INTRAVENOUS CONTINUOUS
Status: CANCELLED | OUTPATIENT
Start: 2019-07-08

## 2019-07-08 RX ORDER — LIDOCAINE 40 MG/G
CREAM TOPICAL
Status: DISCONTINUED | OUTPATIENT
Start: 2019-07-08 | End: 2019-07-08 | Stop reason: HOSPADM

## 2019-07-08 RX ORDER — PROPARACAINE HYDROCHLORIDE 5 MG/ML
SOLUTION/ DROPS OPHTHALMIC PRN
Status: DISCONTINUED | OUTPATIENT
Start: 2019-07-08 | End: 2019-07-08 | Stop reason: HOSPADM

## 2019-07-08 RX ORDER — CYCLOPENTOLATE HYDROCHLORIDE 10 MG/ML
1 SOLUTION/ DROPS OPHTHALMIC
Status: COMPLETED | OUTPATIENT
Start: 2019-07-08 | End: 2019-07-08

## 2019-07-08 RX ORDER — BALANCED SALT SOLUTION 6.4; .75; .48; .3; 3.9; 1.7 MG/ML; MG/ML; MG/ML; MG/ML; MG/ML; MG/ML
SOLUTION OPHTHALMIC PRN
Status: DISCONTINUED | OUTPATIENT
Start: 2019-07-08 | End: 2019-07-08 | Stop reason: HOSPADM

## 2019-07-08 RX ORDER — PHENYLEPHRINE HYDROCHLORIDE 25 MG/ML
1 SOLUTION/ DROPS OPHTHALMIC
Status: COMPLETED | OUTPATIENT
Start: 2019-07-08 | End: 2019-07-08

## 2019-07-08 RX ORDER — TROPICAMIDE 10 MG/ML
1 SOLUTION/ DROPS OPHTHALMIC
Status: COMPLETED | OUTPATIENT
Start: 2019-07-08 | End: 2019-07-08

## 2019-07-08 RX ADMIN — SODIUM CHLORIDE, POTASSIUM CHLORIDE, SODIUM LACTATE AND CALCIUM CHLORIDE: 600; 310; 30; 20 INJECTION, SOLUTION INTRAVENOUS at 09:58

## 2019-07-08 RX ADMIN — MIDAZOLAM 2 MG: 1 INJECTION INTRAMUSCULAR; INTRAVENOUS at 11:07

## 2019-07-08 RX ADMIN — CYCLOPENTOLATE HYDROCHLORIDE 1 DROP: 10 SOLUTION/ DROPS OPHTHALMIC at 10:10

## 2019-07-08 RX ADMIN — PHENYLEPHRINE HYDROCHLORIDE 1 DROP: 25 SOLUTION/ DROPS OPHTHALMIC at 10:10

## 2019-07-08 RX ADMIN — TROPICAMIDE 1 DROP: 10 SOLUTION/ DROPS OPHTHALMIC at 10:05

## 2019-07-08 RX ADMIN — CYCLOPENTOLATE HYDROCHLORIDE 1 DROP: 10 SOLUTION/ DROPS OPHTHALMIC at 10:05

## 2019-07-08 RX ADMIN — PHENYLEPHRINE HYDROCHLORIDE 1 DROP: 25 SOLUTION/ DROPS OPHTHALMIC at 10:06

## 2019-07-08 RX ADMIN — TROPICAMIDE 1 DROP: 10 SOLUTION/ DROPS OPHTHALMIC at 10:09

## 2019-07-08 RX ADMIN — LIDOCAINE HYDROCHLORIDE 1 ML: 10 INJECTION, SOLUTION EPIDURAL; INFILTRATION; INTRACAUDAL; PERINEURAL at 09:59

## 2019-07-08 RX ADMIN — PHENYLEPHRINE HYDROCHLORIDE 1 DROP: 25 SOLUTION/ DROPS OPHTHALMIC at 09:57

## 2019-07-08 RX ADMIN — TROPICAMIDE 1 DROP: 10 SOLUTION/ DROPS OPHTHALMIC at 09:56

## 2019-07-08 RX ADMIN — CYCLOPENTOLATE HYDROCHLORIDE 1 DROP: 10 SOLUTION/ DROPS OPHTHALMIC at 09:57

## 2019-07-08 ASSESSMENT — MIFFLIN-ST. JEOR: SCORE: 1285.65

## 2019-07-08 NOTE — OP NOTE
CATARACT OPERATIVE NOTE    PATIENT: Precious Reyes  DATE OF SURGERY: 7/8/2019  PREOPERATIVE DIAGNOSIS:  Senile Nuclear Cataract, Left eye  POSTOPERATIVE DIAGNOSIS:  Senile Nuclear Cataract, Left eye  OPERATIVE PROCEDURE:  Phacoemulsification and placement of intraocular lens  SURGEON:  Basil Ruiz MD  ANESTHESIA:  Topical / MAC  EBL:  None  SPECIMENS:  None  COMPLICATIONS:  None    PROCEDURE:  The patient was brought to the operating room at [facility].  The left eye was prepped and draped in the usual fashion for cataract surgery.  A wire lid speculum was inserted.  A super sharp blade was used to make a paracentesis at the 5 O'clock position.  The super sharp blade was used to make a partial thickness temporal groove, which was 3 mm in length.  0.8 mL of non-preserved epi-Shugarcaine was injected into the anterior chamber.  Viscoelastic was used to inflate the anterior chamber through a cannula.  A 2.5 mm microkeratome was used to make a temporal clear corneal incision in a two-plane fashion.  A cystotome needle and forceps were used to make a capsulorrhexis.  Hydrodissection and hydrodelineation were performed with Balance Salt Solution.  The lens was then phacoemulsified and removed without complications.  The cortical material was removed with bimanual irrigation and aspiration.  The capsular bag was filled with viscoelastic.  A posterior chamber intraocular lens, preselected and recorded, was folded and inserted into the capsular bag.  The viscoelastic was removed with the irrigation and aspiration tip.  Balanced Salt Solution with Vigamox, 150mg/0.1mL, was used to refill the anterior chamber.  The wounds were checked for water tightness and required no suture.  The wire lid speculum was removed.  The patient's left eye was cleaned and a drop of each post-operative drop was placed, followed by a gibson shield.  The patient tolerated the procedure well, and there were no  complications.      Basil Ruiz MD

## 2019-07-08 NOTE — ANESTHESIA PREPROCEDURE EVALUATION
Anesthesia Pre-Procedure Evaluation    Patient: Precious Reyes   MRN: [id number] : 1950          Preoperative Diagnosis: cataract    Procedure(s):  Cataract Removal with Implant    Past Medical History:   Diagnosis Date     Cholecystitis, unspecified      Malignant melanoma (H)      Other malignant neoplasm of skin, site unspecified ??????    Non-melanoma skin cancer, Pt is checking to learn what  kind of cancer     Unspecified essential hypertension     atenolol     URINARY INCONTINENCE 5/3/2005    detrol      Urinary tract infection, site not specified     Recurrent UTI's     Past Surgical History:   Procedure Laterality Date     BIOPSY BREAST       COLONOSCOPY  age 49    repeat 10 years, done at outside hospital     COLPORRHAPHY POSTERIOR N/A 2018    Procedure: COLPORRHAPHY POSTERIOR;  Posterior Vaginal Repair;  Surgeon: Dora Eagle MD;  Location: [facility] OR     HC EXCISION BREAST LESION, OPEN >=1      5 biopsies all neg     HC REMOVAL GALLBLADDER      Cholecystectomy     HYSTERECTOMY, PAP NO LONGER INDICATED  2004    TVH-BSO, Lynx Pubovaginal sling     PHACOEMULSIFICATION WITH STANDARD INTRAOCULAR LENS IMPLANT Right 2019    Procedure: Cataract Removal  with Implant;  Surgeon: Basil Ruiz MD;  Location: [facility] OR       Anesthesia Evaluation     . Pt has had prior anesthetic. Type: General and MAC    No history of anesthetic complications          ROS/MED HX    ENT/Pulmonary:  - neg pulmonary ROS     Neurologic:  - neg neurologic ROS     Cardiovascular:     (+) Dyslipidemia, hypertension----. : . . . :. .       METS/Exercise Tolerance:  >4 METS   Hematologic:  - neg hematologic  ROS       Musculoskeletal: Comment: Disorder of bone and cartilage  (+) arthritis,  -       GI/Hepatic:  - neg GI/hepatic ROS       Renal/Genitourinary:  - ROS Renal section negative       Endo:  - neg endo ROS       Psychiatric:  - neg psychiatric ROS       Infectious Disease:  -  "neg infectious disease ROS       Malignancy:   (+) Malignancy History of Skin  Skin CA Remission status post Surgery,         Other:    - neg other ROS                      Physical Exam  Normal systems: cardiovascular, pulmonary and dental    Airway   Mallampati: II  TM distance: >3 FB  Neck ROM: full    Dental     Cardiovascular   Rhythm and rate: regular and normal      Pulmonary    breath sounds clear to auscultation            Lab Results   Component Value Date    WBC 7.3 09/30/2014    HGB 14.8 09/30/2014    HCT 42.3 09/30/2014     09/30/2014     09/25/2018    POTASSIUM 3.1 (L) 09/25/2018    CHLORIDE 103 09/25/2018    CO2 31 09/25/2018    BUN 17 09/25/2018    CR 0.84 09/25/2018    GLC 94 09/25/2018    KARIME 8.1 (L) 09/25/2018    ALBUMIN 3.8 09/30/2014    PROTTOTAL 7.3 09/30/2014    ALT 49 09/30/2014    AST 26 09/30/2014    ALKPHOS 82 09/30/2014    BILITOTAL 0.5 09/30/2014    LIPASE 97 09/30/2014    AMYLASE 41 09/12/2005    INR 0.98 09/12/2005    TSH 1.15 08/21/2007    T4 0.85 08/21/2007       Preop Vitals  BP Readings from Last 3 Encounters:   07/08/19 155/76   05/22/19 143/70   09/25/18 138/66    Pulse Readings from Last 3 Encounters:   09/25/18 56   08/29/18 52   08/01/18 59      Resp Readings from Last 3 Encounters:   07/08/19 18   05/22/19 12   09/25/18 10    SpO2 Readings from Last 3 Encounters:   07/08/19 97%   05/22/19 98%   09/25/18 98%      Temp Readings from Last 1 Encounters:   07/08/19 36.8  C (98.2  F) (Oral)    Ht Readings from Last 1 Encounters:   07/08/19 1.575 m (5' 2\")      Wt Readings from Last 1 Encounters:   07/08/19 80.7 kg (178 lb)    Estimated body mass index is 32.56 kg/m  as calculated from the following:    Height as of this encounter: 1.575 m (5' 2\").    Weight as of this encounter: 80.7 kg (178 lb).       Anesthesia Plan      History & Physical Review  History and physical reviewed and following examination; no interval change.    ASA Status:  2 .    NPO Status:  > 8 " hours    Plan for MAC Reason for MAC:  Procedure to face, neck, head or breast         Postoperative Care      Consents  Anesthetic plan, risks, benefits and alternatives discussed with:  Patient..                 Jameel Forbes CRNA, APRN CRNA

## 2019-07-08 NOTE — ANESTHESIA POSTPROCEDURE EVALUATION
Patient: Precious Reyes    Procedure(s):  Cataract Removal with Implant    Diagnosis:cataract  Diagnosis Additional Information: No value filed.    Anesthesia Type:  MAC    Note:  Anesthesia Post Evaluation    Patient location during evaluation: Phase 2 and Bedside  Patient participation: Able to fully participate in evaluation  Level of consciousness: awake and alert  Pain management: adequate  Airway patency: patent  Cardiovascular status: acceptable  Respiratory status: acceptable  Hydration status: acceptable  PONV: none     Anesthetic complications: None          Last vitals:  Vitals:    07/08/19 0931 07/08/19 1128   BP: 155/76 140/70   Resp: 18    Temp: 36.8  C (98.2  F)    SpO2: 97% 98%         Electronically Signed By: Jameel Forbes CRNA, APRN CRNA  July 8, 2019  11:35 AM

## 2019-08-30 ENCOUNTER — OFFICE VISIT (OUTPATIENT)
Dept: FAMILY MEDICINE | Facility: CLINIC | Age: 69
End: 2019-08-30
Payer: COMMERCIAL

## 2019-08-30 VITALS
DIASTOLIC BLOOD PRESSURE: 68 MMHG | OXYGEN SATURATION: 98 % | HEIGHT: 63 IN | SYSTOLIC BLOOD PRESSURE: 136 MMHG | TEMPERATURE: 98.7 F | RESPIRATION RATE: 16 BRPM | HEART RATE: 52 BPM | BODY MASS INDEX: 27.32 KG/M2 | WEIGHT: 154.2 LBS

## 2019-08-30 DIAGNOSIS — R73.9 HYPERGLYCEMIA: ICD-10-CM

## 2019-08-30 DIAGNOSIS — Z00.00 ENCOUNTER FOR MEDICARE ANNUAL WELLNESS EXAM: Primary | ICD-10-CM

## 2019-08-30 DIAGNOSIS — S46.211A BICEPS STRAIN, RIGHT, INITIAL ENCOUNTER: ICD-10-CM

## 2019-08-30 DIAGNOSIS — N95.2 ATROPHIC VAGINITIS: ICD-10-CM

## 2019-08-30 DIAGNOSIS — Z13.6 CARDIOVASCULAR SCREENING; LDL GOAL LESS THAN 130: ICD-10-CM

## 2019-08-30 DIAGNOSIS — I10 ESSENTIAL HYPERTENSION WITH GOAL BLOOD PRESSURE LESS THAN 140/90: ICD-10-CM

## 2019-08-30 DIAGNOSIS — I10 ESSENTIAL HYPERTENSION: ICD-10-CM

## 2019-08-30 DIAGNOSIS — L98.9 SKIN LESION: ICD-10-CM

## 2019-08-30 DIAGNOSIS — M54.42 ACUTE LEFT-SIDED LOW BACK PAIN WITH LEFT-SIDED SCIATICA: ICD-10-CM

## 2019-08-30 LAB
ANION GAP SERPL CALCULATED.3IONS-SCNC: 6 MMOL/L (ref 3–14)
BUN SERPL-MCNC: 16 MG/DL (ref 7–30)
CALCIUM SERPL-MCNC: 9.1 MG/DL (ref 8.5–10.1)
CHLORIDE SERPL-SCNC: 104 MMOL/L (ref 94–109)
CHOLEST SERPL-MCNC: 201 MG/DL
CO2 SERPL-SCNC: 29 MMOL/L (ref 20–32)
CREAT SERPL-MCNC: 0.85 MG/DL (ref 0.52–1.04)
GFR SERPL CREATININE-BSD FRML MDRD: 69 ML/MIN/{1.73_M2}
GLUCOSE SERPL-MCNC: 109 MG/DL (ref 70–99)
HBA1C MFR BLD: 5.5 % (ref 0–5.6)
HDLC SERPL-MCNC: 68 MG/DL
LDLC SERPL CALC-MCNC: 109 MG/DL
NONHDLC SERPL-MCNC: 133 MG/DL
POTASSIUM SERPL-SCNC: 3.2 MMOL/L (ref 3.4–5.3)
SODIUM SERPL-SCNC: 139 MMOL/L (ref 133–144)
TRIGL SERPL-MCNC: 119 MG/DL

## 2019-08-30 PROCEDURE — 80048 BASIC METABOLIC PNL TOTAL CA: CPT | Performed by: FAMILY MEDICINE

## 2019-08-30 PROCEDURE — 83036 HEMOGLOBIN GLYCOSYLATED A1C: CPT | Performed by: FAMILY MEDICINE

## 2019-08-30 PROCEDURE — 80061 LIPID PANEL: CPT | Performed by: FAMILY MEDICINE

## 2019-08-30 PROCEDURE — 99213 OFFICE O/P EST LOW 20 MIN: CPT | Mod: 25 | Performed by: FAMILY MEDICINE

## 2019-08-30 PROCEDURE — 99397 PER PM REEVAL EST PAT 65+ YR: CPT | Performed by: FAMILY MEDICINE

## 2019-08-30 PROCEDURE — 36415 COLL VENOUS BLD VENIPUNCTURE: CPT | Performed by: FAMILY MEDICINE

## 2019-08-30 RX ORDER — ESTRADIOL 0.5 MG/1
TABLET ORAL
Qty: 24 TABLET | Refills: 3 | Status: SHIPPED | OUTPATIENT
Start: 2019-08-30 | End: 2020-08-31

## 2019-08-30 RX ORDER — ATENOLOL 50 MG/1
75 TABLET ORAL DAILY
Qty: 135 TABLET | Refills: 3 | Status: SHIPPED | OUTPATIENT
Start: 2019-08-30

## 2019-08-30 RX ORDER — HYDROCHLOROTHIAZIDE 25 MG/1
25 TABLET ORAL DAILY
Qty: 90 TABLET | Refills: 3 | Status: SHIPPED | OUTPATIENT
Start: 2019-08-30

## 2019-08-30 ASSESSMENT — MIFFLIN-ST. JEOR: SCORE: 1180.64

## 2019-08-30 NOTE — PATIENT INSTRUCTIONS
Please go to lab.    I refilled your medications.    Please be aware that there will be an additional charge during your preventative visit due to either a new diagnosis and/or chronic disease management.    Preventative visits screen for diseases prior to they occur.  They do not cover for any new diagnosis or chronic disease management.     If you have questions regarding your coverage please check with your insurance provider.  At [facility] we need to code correctly to be in compliance with all insurance companies.        Thank you for choosing [facility].  You may be receiving an email and/or telephone survey request from [facility] Customer Experience regarding your visit today.  Please take a few minutes to respond to the survey to let us know how we are doing.      If you have questions or concerns, please contact us via [email or web address] or you can contact your care team at [phone].    Our Clinic hours are:  Monday 6:40 am  to 7:00 pm  Tuesday -Friday 6:40 am to 5:00 pm    The [place] outpatient lab hours are:  Monday - Friday 6:10 am to 4:45 pm  Saturdays 7:00 am to 11:00 am  Appointments are required, call [phone]    If you have clinical questions after hours or would like to schedule an appointment,  call the clinic at [phone].    Patient Education   Personalized Prevention Plan  You are due for the preventive services outlined below.  Your care team is available to assist you in scheduling these services.  If you have already completed any of these items, please share that information with your care team to update in your medical record.  Health Maintenance Due   Topic Date Due     Hepatitis C Screening  1950     Skin Cancer Screening  1950     Zoster (Shingles) Vaccine (2 of 3) 11/13/2013     Discuss Advance Care Planning  06/06/2017     PHQ-2  01/01/2019     Annual Wellness Visit  09/25/2019     FALL RISK ASSESSMENT  09/25/2019        Patient Education   Personalized Prevention  Plan  You are due for the preventive services outlined below.  Your care team is available to assist you in scheduling these services.  If you have already completed any of these items, please share that information with your care team to update in your medical record.  Health Maintenance Due   Topic Date Due     Hepatitis C Screening  1950     Skin Cancer Screening  1950     Zoster (Shingles) Vaccine (2 of 3) 11/13/2013     Discuss Advance Care Planning  06/06/2017     PHQ-2  01/01/2019     Annual Wellness Visit  09/25/2019     FALL RISK ASSESSMENT  09/25/2019        Patient Education   Personalized Prevention Plan  You are due for the preventive services outlined below.  Your care team is available to assist you in scheduling these services.  If you have already completed any of these items, please share that information with your care team to update in your medical record.  Health Maintenance Due   Topic Date Due     Hepatitis C Screening  1950     Skin Cancer Screening  1950     Zoster (Shingles) Vaccine (2 of 3) 11/13/2013     Discuss Advance Care Planning  06/06/2017     PHQ-2  01/01/2019     Annual Wellness Visit  09/25/2019     FALL RISK ASSESSMENT  09/25/2019

## 2019-08-30 NOTE — PROGRESS NOTES
"SUBJECTIVE:   Precious Reyes is a 69 year old female who presents for Preventive Visit.      Are you in the first 12 months of your Medicare Part B coverage?  No    Physical Health:    In general, how would you rate your overall physical health? excellent    Outside of work, how many days during the week do you exercise? 6-7 days/week    Outside of work, approximately how many minutes a day do you exercise?30-45 minutes    If you drink alcohol do you typically have >3 drinks per day or >7 drinks per week? No    Do you usually eat at least 4 servings of fruit and vegetables a day, include whole grains & fiber and avoid regularly eating high fat or \"junk\" foods? Yes    Do you have any problems taking medications regularly?  No    Do you have any side effects from medications? none    Needs assistance for the following daily activities: no assistance needed    Which of the following safety concerns are present in your home?  none identified     Hearing impairment: Yes, Difficulty following a conversation in a noisy restaurant or crowded room.    Feel that people are mumbling or not speaking clearly.    Difficulty following dialogue in the theater.    Difficult to understand a speaker at a public meeting or Christianity service.    Need to ask people to speak up or repeat themselves.    Difficulty understanding soft or whispered speech.    In the past 6 months, have you been bothered by leaking of urine? no    Mental Health:    In general, how would you rate your overall mental or emotional health? excellent  PHQ-2 Score: 0    Do you feel safe in your environment? Yes    Do you have a Health Care Directive? Yes: Advance Directive has been received and scanned.    Additional concerns to address?  No    Fall risk:  Fallen 2 or more times in the past year?: No  Any fall with injury in the past year?: No    Cognitive Screenin) Repeat 3 items (Leader, Season, Table)    2) Clock draw: NORMAL  3) 3 item recall: Recalls 3 " objects  Results: 3 items recalled: COGNITIVE IMPAIRMENT LESS LIKELY    Mini-CogTM Copyright ZAYDA Hernandes. Licensed by the author for use in [facility]; reprinted with permission (ramiro@.[email or web address]). All rights reserved.      Do you have sleep apnea, excessive snoring or daytime drowsiness?: yes            Reviewed and updated as needed this visit by clinical staff  Tobacco  Allergies  Meds  Med Hx  Surg Hx  Fam Hx  Soc Hx        Reviewed and updated as needed this visit by Provider        Social History     Tobacco Use     Smoking status: Never Smoker     Smokeless tobacco: Never Used   Substance Use Topics     Alcohol use: Yes     Comment: 0-2 drinks per week                           Current providers sharing in care for this patient include:   Patient Care Team:  Chau oCx MD as PCP - General  Tiesha Garcia PA as Physician Assistant (Physician Assistant)  Chau Cox MD as Assigned PCP    The following health maintenance items are reviewed in Epic and correct as of today:  Health Maintenance   Topic Date Due     HEPATITIS C SCREENING  1950     SKIN CANCER SCREENING  1950     ZOSTER IMMUNIZATION (2 of 3) 11/13/2013     ADVANCE CARE PLANNING  06/06/2017     PHQ-2  01/01/2019     MEDICARE ANNUAL WELLNESS VISIT  09/25/2019     FALL RISK ASSESSMENT  09/25/2019     INFLUENZA VACCINE (1) 09/01/2019     COLONOSCOPY  10/01/2019     MAMMO SCREENING  05/28/2021     LIPID  09/25/2023     DTAP/TDAP/TD IMMUNIZATION (3 - Td) 09/25/2028     DEXA  Completed     PNEUMOCOCCAL IMMUNIZATION 65+ LOW/MEDIUM RISK  Completed     IPV IMMUNIZATION  Aged Out     MENINGITIS IMMUNIZATION  Aged Out           ROS:  Review Of Systems  Skin: would like to see dermatology. Skin lesion right forearm, has h/o sun exposure  Eyes: negative, had cataracts  Ears/Nose/Throat: not hearing well out of the right ear  Respiratory: No shortness of breath, dyspnea on exertion, cough, or hemoptysis  Cardiovascular:  "negative  Gastrointestinal: negative  Genitourinary: negative  Musculoskeletal: has left sciatica. Pain in left buttock and goes to posterior left thigh.  Has also pain in right biceps, was zip lining for 49 th wedding anniversary and strain her arm on the course  2 months ago and not getting better.   Neurologic: negative  Psychiatric: negative  Hematologic/Lymphatic/Immunologic: negative  Endocrine: negative  Wt Readings from Last 4 Encounters:   08/30/19 69.9 kg (154 lb 3.2 oz)   07/08/19 80.7 kg (178 lb)   05/22/19 78.9 kg (174 lb)   09/25/18 78.9 kg (174 lb)         OBJECTIVE:   /68   Pulse 52   Temp 98.7  F (37.1  C) (Tympanic)   Resp 16   Ht 1.588 m (5' 2.5\")   Wt 69.9 kg (154 lb 3.2 oz)   SpO2 98%   BMI 27.75 kg/m   Estimated body mass index is 27.75 kg/m  as calculated from the following:    Height as of this encounter: 1.588 m (5' 2.5\").    Weight as of this encounter: 69.9 kg (154 lb 3.2 oz).  EXAM:   GENERAL: healthy, alert and no distress  EYES: Eyes grossly normal to inspection, PERRL and conjunctivae and sclerae normal  HENT: ear canals and TM's normal, nose and mouth without ulcers or lesions  NECK: no adenopathy, no asymmetry, masses, or scars and thyroid normal to palpation  RESP: lungs clear to auscultation - no rales, rhonchi or wheezes  BREAST: NE  CV: regular rate and rhythm, normal S1 S2, no S3 or S4, no murmur, click or rub, no peripheral edema and peripheral pulses strong  ABDOMEN: soft, nontender, no hepatosplenomegaly, no masses and bowel sounds normal   (female): NE  MS: no gross musculoskeletal defects noted, no edema  MS: she has pain with resistance in the mid muscle of right biceps, no bruising no other skin changes  SKIN: no suspicious lesions or rashes  SKIN: has small brown macule on right forearm distal about 3 mm diameter, likely benign, however she has h/o sun exposure  NEURO: Normal strength and tone, mentation intact and speech normal  PSYCH: mentation appears " normal, affect normal/bright        ASSESSMENT / PLAN:   (Z00.00) Encounter for Medicare annual wellness exam  (primary encounter diagnosis)  Comment: Discussed healthy lifestyle and preventative cares.    Plan:     (I10) Essential hypertension  Comment: controlled and refilled med  Plan: atenolol (TENORMIN) 50 MG tablet, Basic         metabolic panel, OFFICE/OUTPT VISIT,EST,LEVL         III            (N95.2) Atrophic vaginitis  Comment: refilled med  Plan: estradiol (ESTRACE) 0.5 MG tablet, OFFICE/OUTPT        VISIT,EST,LEVL III            (I10) Essential hypertension with goal blood pressure less than 140/90  Comment:   Plan: hydrochlorothiazide (HYDRODIURIL) 25 MG tablet,        OFFICE/OUTPT VISIT,EST,LEVL III            (R73.9) Hyperglycemia  Comment: check for prediabetes and diabetes  Plan: Hemoglobin A1c, OFFICE/OUTPT VISIT,EST,LEVL III            (M54.42) Acute left-sided low back pain with left-sided sciatica  Comment: referral done   Plan: PHYSICAL THERAPY REFERRAL, OFFICE/OUTPT         VISIT,EST,LEVL III            (S46.211A) Biceps strain, right, initial encounter  Comment: referral is done  Plan: PHYSICAL THERAPY REFERRAL, OFFICE/OUTPT         VISIT,EST,LEVL III            (L98.9) Skin lesion  Comment: referral  Plan: DERMATOLOGY REFERRAL, OFFICE/OUTPT         VISIT,EST,LEVL III            (Z13.6) CARDIOVASCULAR SCREENING; LDL GOAL LESS THAN 130  Comment:   Plan: Lipid panel reflex to direct LDL Fasting              End of Life Planning:  Patient currently has an advanced directive: No.  I have verified the patient's ablity to prepare an advanced directive/make health care decisions.  Literature was provided to assist patient in preparing an advanced directive.    COUNSELING:  Reviewed preventive health counseling, as reflected in patient instructions       Regular exercise       Healthy diet/nutrition       Vision screening       Hearing screening       Dental care       Colon cancer  "screening    Estimated body mass index is 27.75 kg/m  as calculated from the following:    Height as of this encounter: 1.588 m (5' 2.5\").    Weight as of this encounter: 69.9 kg (154 lb 3.2 oz).         reports that she has never smoked. She has never used smokeless tobacco.      Appropriate preventive services were discussed with this patient, including applicable screening as appropriate for cardiovascular disease, diabetes, osteopenia/osteoporosis, and glaucoma.  As appropriate for age/gender, discussed screening for colorectal cancer, prostate cancer, breast cancer, and cervical cancer. Checklist reviewing preventive services available has been given to the patient.    Reviewed patients plan of care and provided an AVS. The Basic Care Plan (routine screening as documented in Health Maintenance) for Precious meets the Care Plan requirement. This Care Plan has been established and reviewed with the Patient.    Counseling Resources:  ATP IV Guidelines  Pooled Cohorts Equation Calculator  Breast Cancer Risk Calculator  FRAX Risk Assessment  ICSI Preventive Guidelines  Dietary Guidelines for Americans, 2010  USDA's MyPlate  ASA Prophylaxis  Lung CA Screening    Chau Cox MD  [facility]  "

## 2019-09-29 ENCOUNTER — HEALTH MAINTENANCE LETTER (OUTPATIENT)
Age: 69
End: 2019-09-29

## 2020-08-30 ENCOUNTER — TELEPHONE (OUTPATIENT)
Dept: FAMILY MEDICINE | Facility: CLINIC | Age: 70
End: 2020-08-30

## 2020-08-30 DIAGNOSIS — N95.2 ATROPHIC VAGINITIS: ICD-10-CM

## 2020-08-30 NOTE — LETTER
[facility]  [address] [address]  Phone: [phone]    September 1, 2020      Precious Reyes                                                                                                                          [address]  [address]  [place] [address]                    Dear Ms. Reyes,    We are concerned about your health care.  We recently provided you with a medication refill.  Many medications require routine follow-up with your Doctor.      At this time we ask that: You come to your clinic for an Office visit and routine labs for medication monitoring.    (Any necessary labs will be done at your office visit.)    Your prescription: Has been refilled for 1 month so you may have time for the above noted follow-up.      Thank you,      Chau Cox MD / MYRA Flores

## 2020-08-31 RX ORDER — ESTRADIOL 0.5 MG/1
TABLET ORAL
Qty: 24 TABLET | Refills: 0 | Status: SHIPPED | OUTPATIENT
Start: 2020-08-31

## 2020-08-31 NOTE — TELEPHONE ENCOUNTER
"Requested Prescriptions   Pending Prescriptions Disp Refills     estradiol (ESTRACE) 0.5 MG tablet [Pharmacy Med Name: ESTRADIOL TABS 0.5MG] 24 tablet 3     Sig: INSERT 1 TABLET VAGINALLY AT BEDTIME TWICE A WEEK       Hormone Replacement Therapy Failed - 8/30/2020  9:40 AM        Failed - Blood pressure under 140/90 in past 12 months     BP Readings from Last 3 Encounters:   08/30/19 136/68   07/08/19 146/69   05/22/19 143/70                 Failed - Recent (12 mo) or future (30 days) visit within the authorizing provider's specialty     Patient has had an office visit with the authorizing provider or a provider within the authorizing providers department within the previous 12 mos or has a future within next 30 days. See \"Patient Info\" tab in inbasket, or \"Choose Columns\" in Meds & Orders section of the refill encounter.              Passed - Patient has mammogram in past 2 years on file if age 50-75        Passed - Medication is active on med list        Passed - Patient is 18 years of age or older        Passed - No active pregnancy on record        Passed - No positive pregnancy test on record in past 12 months             "

## 2021-01-14 ENCOUNTER — HEALTH MAINTENANCE LETTER (OUTPATIENT)
Age: 71
End: 2021-01-14

## 2021-08-29 ENCOUNTER — HEALTH MAINTENANCE LETTER (OUTPATIENT)
Age: 71
End: 2021-08-29

## 2021-10-24 ENCOUNTER — HEALTH MAINTENANCE LETTER (OUTPATIENT)
Age: 71
End: 2021-10-24

## 2022-02-12 ENCOUNTER — HEALTH MAINTENANCE LETTER (OUTPATIENT)
Age: 72
End: 2022-02-12

## 2022-10-10 ENCOUNTER — HEALTH MAINTENANCE LETTER (OUTPATIENT)
Age: 72
End: 2022-10-10

## 2023-03-25 ENCOUNTER — HEALTH MAINTENANCE LETTER (OUTPATIENT)
Age: 73
End: 2023-03-25

## 2023-10-29 ENCOUNTER — HEALTH MAINTENANCE LETTER (OUTPATIENT)
Age: 73
End: 2023-10-29

## (undated) DEVICE — SOL NACL 0.9% IRRIG 1000ML BOTTLE 07138-09

## (undated) DEVICE — SOL WATER IRRIG 1000ML BOTTLE 07139-09

## (undated) DEVICE — CATH TRAY FOLEY SURESTEP 16FR W/URINE MTR STATLK LF A303416A

## (undated) DEVICE — ESU PENCIL W/COATED BLADE E2450H

## (undated) DEVICE — GLOVE PROTEXIS W/NEU-THERA 6.5  2D73TE65

## (undated) DEVICE — GOWN LG DISP 9515

## (undated) DEVICE — SU VICRYL 3-0 CT-1 36" J944H

## (undated) DEVICE — PAD PERI INDIV WRAP 11" 2022

## (undated) DEVICE — PACK LAPAROSCOPY/PELVISCOPY STD

## (undated) DEVICE — CATH SECURE 5445-3

## (undated) DEVICE — SU VICRYL 2-0 CT-2 CR 8X18" J726D

## (undated) DEVICE — SUCTION TIP YANKAUER STR K87

## (undated) DEVICE — LUBRICATING JELLY 4.25OZ

## (undated) DEVICE — TUBING SUCTION MEDI-VAC 1/4"X20' N620A

## (undated) RX ORDER — PROPOFOL 10 MG/ML
INJECTION, EMULSION INTRAVENOUS
Status: DISPENSED
Start: 2018-07-17

## (undated) RX ORDER — BUPIVACAINE HYDROCHLORIDE AND EPINEPHRINE 2.5; 5 MG/ML; UG/ML
INJECTION, SOLUTION EPIDURAL; INFILTRATION; INTRACAUDAL; PERINEURAL
Status: DISPENSED
Start: 2018-07-17

## (undated) RX ORDER — TROPICAMIDE 10 MG/ML
SOLUTION/ DROPS OPHTHALMIC
Status: DISPENSED
Start: 2019-07-08

## (undated) RX ORDER — CYCLOPENTOLATE HYDROCHLORIDE 10 MG/ML
SOLUTION/ DROPS OPHTHALMIC
Status: DISPENSED
Start: 2019-05-22

## (undated) RX ORDER — PHENYLEPHRINE HYDROCHLORIDE 25 MG/ML
SOLUTION/ DROPS OPHTHALMIC
Status: DISPENSED
Start: 2019-05-22

## (undated) RX ORDER — OXYCODONE HCL 10 MG/1
TABLET, FILM COATED, EXTENDED RELEASE ORAL
Status: DISPENSED
Start: 2018-07-17

## (undated) RX ORDER — CYCLOPENTOLATE HYDROCHLORIDE 10 MG/ML
SOLUTION/ DROPS OPHTHALMIC
Status: DISPENSED
Start: 2019-07-08

## (undated) RX ORDER — ONDANSETRON 2 MG/ML
INJECTION INTRAMUSCULAR; INTRAVENOUS
Status: DISPENSED
Start: 2018-07-17

## (undated) RX ORDER — PROPARACAINE HYDROCHLORIDE 5 MG/ML
SOLUTION/ DROPS OPHTHALMIC
Status: DISPENSED
Start: 2019-05-22

## (undated) RX ORDER — TROPICAMIDE 10 MG/ML
SOLUTION/ DROPS OPHTHALMIC
Status: DISPENSED
Start: 2019-05-22

## (undated) RX ORDER — SCOLOPAMINE TRANSDERMAL SYSTEM 1 MG/1
PATCH, EXTENDED RELEASE TRANSDERMAL
Status: DISPENSED
Start: 2018-07-17

## (undated) RX ORDER — FENTANYL CITRATE 50 UG/ML
INJECTION, SOLUTION INTRAMUSCULAR; INTRAVENOUS
Status: DISPENSED
Start: 2018-07-17

## (undated) RX ORDER — PHENYLEPHRINE HYDROCHLORIDE 25 MG/ML
SOLUTION/ DROPS OPHTHALMIC
Status: DISPENSED
Start: 2019-07-08